# Patient Record
Sex: MALE | Race: WHITE | NOT HISPANIC OR LATINO | Employment: FULL TIME | URBAN - METROPOLITAN AREA
[De-identification: names, ages, dates, MRNs, and addresses within clinical notes are randomized per-mention and may not be internally consistent; named-entity substitution may affect disease eponyms.]

---

## 2017-01-24 ENCOUNTER — HOSPITAL ENCOUNTER (OUTPATIENT)
Dept: RADIOLOGY | Facility: MEDICAL CENTER | Age: 37
End: 2017-01-24
Attending: NURSE PRACTITIONER
Payer: COMMERCIAL

## 2017-01-24 ENCOUNTER — OFFICE VISIT (OUTPATIENT)
Dept: MEDICAL GROUP | Facility: PHYSICIAN GROUP | Age: 37
End: 2017-01-24
Payer: COMMERCIAL

## 2017-01-24 VITALS
OXYGEN SATURATION: 96 % | BODY MASS INDEX: 29.16 KG/M2 | DIASTOLIC BLOOD PRESSURE: 86 MMHG | HEART RATE: 69 BPM | SYSTOLIC BLOOD PRESSURE: 154 MMHG | WEIGHT: 247 LBS | RESPIRATION RATE: 16 BRPM | HEIGHT: 77 IN | TEMPERATURE: 98.4 F

## 2017-01-24 DIAGNOSIS — R14.0 ABDOMINAL BLOATING: Primary | ICD-10-CM

## 2017-01-24 DIAGNOSIS — R14.0 ABDOMINAL BLOATING: ICD-10-CM

## 2017-01-24 DIAGNOSIS — R19.4 RECENT CHANGE IN FREQUENCY OF BOWEL MOVEMENTS: ICD-10-CM

## 2017-01-24 PROCEDURE — 74020 DX-ABDOMEN-2 VIEWS: CPT

## 2017-01-24 PROCEDURE — 99213 OFFICE O/P EST LOW 20 MIN: CPT | Performed by: NURSE PRACTITIONER

## 2017-01-24 ASSESSMENT — PATIENT HEALTH QUESTIONNAIRE - PHQ9: CLINICAL INTERPRETATION OF PHQ2 SCORE: 0

## 2017-01-24 ASSESSMENT — ENCOUNTER SYMPTOMS: ROS GI COMMENTS: SEE HPI

## 2017-01-24 NOTE — PROGRESS NOTES
"Subjective:      Devin Carrasco is a 36 y.o. male who presents with Bloated and GI Problem            GI Problem    Devin is here today to discuss the following new problem:    1. Abdominal bloating 2. Recent change in frequency of bowel movements  Patient reports recent change to his bowel habits.  He also complains of increased abdominal bloating and tenderness.  Symptoms started approximately one month ago without any rhyme or reason.  He denies any recent travel, change to medication or achieving, food habits or recent illness.  He states that he did suffer a cold over the holidays and did have a couple of days where he ate out, but denies any significant changes otherwise.  He states that his stomach feels bloated most of the time with diffuse tenderness.  He also has been experiencing more frequent bowel movements with incomplete evacuation sensation.  He denies any nausea, vomiting or blood in the stool.  He did take a small amount of Pepto-Bismol with mild, temporary relief.  He denies any heartburn.  He has never had an abdominal surgery.    Active Ambulatory Problems     Diagnosis Date Noted   • Seasonal allergies 03/03/2014   • Right shoulder pain 07/02/2014   • Sore throat 09/14/2016     Resolved Ambulatory Problems     Diagnosis Date Noted   • Encounter to establish care with new doctor 09/01/2016     Past Medical History   Diagnosis Date   • Allergy    • Hip fracture (CMS-[facility])      Review of Systems   Gastrointestinal:        See HPI          Objective:     /86 mmHg  Pulse 69  Temp(Src) 36.9 °C (98.4 °F)  Resp 16  Ht 1.956 m (6' 5\")  Wt 112.038 kg (247 lb)  BMI 29.28 kg/m2  SpO2 96%     Physical Exam   Constitutional: He is oriented to person, place, and time. He appears well-developed and well-nourished.   Neck: Tracheal deviation: diffuse, generalized.   Cardiovascular: Normal rate.    Pulmonary/Chest: Effort normal.   Abdominal: Soft. He exhibits distension. He exhibits no mass. Bowel " sounds are increased. There is tenderness. There is no rebound and no guarding. No hernia.   Neurological: He is alert and oriented to person, place, and time.   Skin: Skin is warm and dry.   Psychiatric: He has a normal mood and affect. His behavior is normal.   Nursing note and vitals reviewed.              Assessment/Plan:     1. Abdominal bloating  [id number] VIEWS    US-ABDOMEN COMPLETE SURVEY    CBC WITHOUT DIFFERENTIAL    LIPASE    COMP METABOLIC PANEL    URINALYSIS   2. Recent change in frequency of bowel movements         1.  Abdominal x-ray today, evaluate gas patterns.  2.  Trial of daily probiotic, samples given  3.  Encouraged him to keep track of symptoms to see if there is any specific food or activity that may be to blame.  4.  Consider proceeding with ultrasound and labs if symptoms persist despite trying the above.

## 2017-01-24 NOTE — MR AVS SNAPSHOT
"        Devin Carrasco   2017 10:40 AM   Office Visit   MRN: [id number]    Department:  [facility]   Dept Phone:  [phone]    Description:  Male : 1980   Provider:  SUZANNA Timmons           Reason for Visit     Bloated     GI Problem states he is having more BMs then usual       Allergies as of 2017     Allergen Noted Reactions    Pcn [Penicillins] 2013   Hives, Swelling      You were diagnosed with     Abdominal bloating   [055334]  -  Primary     Recent change in frequency of bowel movements   [[id number]]         Vital Signs     Blood Pressure Pulse Temperature Respirations Height Weight    154/86 mmHg 69 36.9 °C (98.4 °F) 16 1.956 m (6' 5\") 112.038 kg (247 lb)    Body Mass Index Oxygen Saturation Smoking Status             29.28 kg/m2 96% Never Smoker          Basic Information     Date Of Birth Sex Race Ethnicity Preferred Language    1980 Male Unknown Unknown English      Problem List              ICD-10-CM Priority Class Noted - Resolved    Seasonal allergies J30.2   3/3/2014 - Present    Right shoulder pain M25.511   2014 - Present    Sore throat J02.9   2016 - Present      Health Maintenance        Date Due Completion Dates    IMM INFLUENZA (1) 2016 ---    IMM DTaP/Tdap/Td Vaccine (2 - Td) 2025            Current Immunizations     Tdap Vaccine 2015      Below and/or attached are the medications your provider expects you to take. Review all of your home medications and newly ordered medications with your provider and/or pharmacist. Follow medication instructions as directed by your provider and/or pharmacist. Please keep your medication list with you and share with your provider. Update the information when medications are discontinued, doses are changed, or new medications (including over-the-counter products) are added; and carry medication information at all times in the event of emergency situations     Allergies:  PCN - " Hives,Swelling               Medications  Valid as of: January 24, 2017 -  6:34 PM    Generic Name Brand Name Tablet Size Instructions for use    Albuterol Sulfate (Aero Soln) albuterol 108 (90 BASE) MCG/ACT Inhale 2 Puffs by mouth every 6 hours as needed for Shortness of Breath.        Ergocalciferol (Cap) DRISDOL 27739 UNIT Take 1 cap by mouth every day for 10 days, and then take 1 cap by mouth every Tuesday and Saturday.        Loratadine-Pseudoephedrine (TABLET SR 24 HR) CLARITIN-D 24-hour  MG Take 1 Tab by mouth every day.        .                 Medicines prescribed today were sent to:     [facility] [address] [place] [address] [place], [place] - [address] AT [facility] [employer] & [place]    [address] [address]    Phone: [phone] Fax: [phone]    Open 24 Hours?: No      Medication refill instructions:       If your prescription bottle indicates you have medication refills left, it is not necessary to call your provider’s office. Please contact your pharmacy and they will refill your medication.    If your prescription bottle indicates you do not have any refills left, you may request refills at any time through one of the following ways: The online [employer]plified system (except Urgent Care), by calling your provider’s office, or by asking your pharmacy to contact your provider’s office with a refill request. Medication refills are processed only during regular business hours and may not be available until the next business day. Your provider may request additional information or to have a follow-up visit with you prior to refilling your medication.   *Please Note: Medication refills are assigned a new Rx number when refilled electronically. Your pharmacy may indicate that no refills were authorized even though a new prescription for the same medication is available at the pharmacy. Please request the medicine by name with the pharmacy before contacting your provider for a refill.           Your To Do List     Future Labs/Procedures Complete By Expires    CBC WITHOUT DIFFERENTIAL  As directed 7/24/2017    COMP METABOLIC PANEL  As directed 1/24/2018    [id number] VIEWS  As directed 1/24/2018    LIPASE  As directed 1/24/2018    US-ABDOMEN COMPLETE SURVEY  As directed 1/24/2018         MyChart Access Code: Activation code not generated  Current [employer] Status: Active

## 2017-02-27 ENCOUNTER — HOSPITAL ENCOUNTER (OUTPATIENT)
Dept: LAB | Facility: MEDICAL CENTER | Age: 37
End: 2017-02-27
Attending: NURSE PRACTITIONER
Payer: COMMERCIAL

## 2017-02-27 ENCOUNTER — OFFICE VISIT (OUTPATIENT)
Dept: MEDICAL GROUP | Facility: PHYSICIAN GROUP | Age: 37
End: 2017-02-27
Payer: COMMERCIAL

## 2017-02-27 VITALS
DIASTOLIC BLOOD PRESSURE: 92 MMHG | OXYGEN SATURATION: 96 % | TEMPERATURE: 98.1 F | SYSTOLIC BLOOD PRESSURE: 122 MMHG | BODY MASS INDEX: 29.19 KG/M2 | HEART RATE: 77 BPM | HEIGHT: 77 IN | WEIGHT: 247.2 LBS | RESPIRATION RATE: 18 BRPM

## 2017-02-27 DIAGNOSIS — R10.816 EPIGASTRIC ABDOMINAL TENDERNESS WITHOUT REBOUND TENDERNESS: ICD-10-CM

## 2017-02-27 DIAGNOSIS — R14.0 ABDOMINAL BLOATING: ICD-10-CM

## 2017-02-27 DIAGNOSIS — E78.2 MIXED HYPERLIPIDEMIA: ICD-10-CM

## 2017-02-27 DIAGNOSIS — E55.9 VITAMIN D INSUFFICIENCY: ICD-10-CM

## 2017-02-27 DIAGNOSIS — K57.32 DIVERTICULITIS OF LARGE INTESTINE WITHOUT PERFORATION OR ABSCESS WITHOUT BLEEDING: Primary | ICD-10-CM

## 2017-02-27 LAB
25(OH)D3 SERPL-MCNC: 15 NG/ML (ref 30–100)
ALBUMIN SERPL BCP-MCNC: 4.5 G/DL (ref 3.2–4.9)
ALBUMIN/GLOB SERPL: 1.3 G/DL
ALP SERPL-CCNC: 73 U/L (ref 30–99)
ALT SERPL-CCNC: 46 U/L (ref 2–50)
ANION GAP SERPL CALC-SCNC: 10 MMOL/L (ref 0–11.9)
AST SERPL-CCNC: 27 U/L (ref 12–45)
BILIRUB SERPL-MCNC: 1.6 MG/DL (ref 0.1–1.5)
BUN SERPL-MCNC: 14 MG/DL (ref 8–22)
CALCIUM SERPL-MCNC: 10.1 MG/DL (ref 8.5–10.5)
CHLORIDE SERPL-SCNC: 102 MMOL/L (ref 96–112)
CHOLEST SERPL-MCNC: 230 MG/DL (ref 100–199)
CO2 SERPL-SCNC: 26 MMOL/L (ref 20–33)
CREAT SERPL-MCNC: 1.04 MG/DL (ref 0.5–1.4)
ERYTHROCYTE [DISTWIDTH] IN BLOOD BY AUTOMATED COUNT: 38.6 FL (ref 35.9–50)
GLOBULIN SER CALC-MCNC: 3.6 G/DL (ref 1.9–3.5)
GLUCOSE SERPL-MCNC: 75 MG/DL (ref 65–99)
HCT VFR BLD AUTO: 49.4 % (ref 42–52)
HDLC SERPL-MCNC: 37 MG/DL
HGB BLD-MCNC: 16.4 G/DL (ref 14–18)
LDLC SERPL CALC-MCNC: 127 MG/DL
LIPASE SERPL-CCNC: 18 U/L (ref 11–82)
MCH RBC QN AUTO: 29 PG (ref 27–33)
MCHC RBC AUTO-ENTMCNC: 33.2 G/DL (ref 33.7–35.3)
MCV RBC AUTO: 87.4 FL (ref 81.4–97.8)
PLATELET # BLD AUTO: 264 K/UL (ref 164–446)
PMV BLD AUTO: 10.4 FL (ref 9–12.9)
POTASSIUM SERPL-SCNC: 3.8 MMOL/L (ref 3.6–5.5)
PROT SERPL-MCNC: 8.1 G/DL (ref 6–8.2)
RBC # BLD AUTO: 5.65 M/UL (ref 4.7–6.1)
SODIUM SERPL-SCNC: 138 MMOL/L (ref 135–145)
TRIGL SERPL-MCNC: 328 MG/DL (ref 0–149)
WBC # BLD AUTO: 6.2 K/UL (ref 4.8–10.8)

## 2017-02-27 PROCEDURE — 36415 COLL VENOUS BLD VENIPUNCTURE: CPT

## 2017-02-27 PROCEDURE — 80061 LIPID PANEL: CPT

## 2017-02-27 PROCEDURE — 80053 COMPREHEN METABOLIC PANEL: CPT

## 2017-02-27 PROCEDURE — 99214 OFFICE O/P EST MOD 30 MIN: CPT | Performed by: NURSE PRACTITIONER

## 2017-02-27 PROCEDURE — 82306 VITAMIN D 25 HYDROXY: CPT

## 2017-02-27 PROCEDURE — 85027 COMPLETE CBC AUTOMATED: CPT

## 2017-02-27 PROCEDURE — 83690 ASSAY OF LIPASE: CPT

## 2017-02-27 RX ORDER — CIPROFLOXACIN 500 MG/1
500 TABLET, FILM COATED ORAL 2 TIMES DAILY
Qty: 20 TAB | Refills: 0 | Status: SHIPPED | OUTPATIENT
Start: 2017-02-27 | End: 2017-03-09

## 2017-02-27 RX ORDER — METRONIDAZOLE 500 MG/1
500 TABLET ORAL 2 TIMES DAILY
Qty: 20 TAB | Refills: 0 | Status: SHIPPED | OUTPATIENT
Start: 2017-02-27 | End: 2017-03-09

## 2017-02-27 ASSESSMENT — ENCOUNTER SYMPTOMS: ROS GI COMMENTS: SEE HPI

## 2017-02-27 NOTE — PROGRESS NOTES
"Subjective:      Devin Carrasco is a 36 y.o. male who presents with Follow-Up            HPI  Devin is here to discuss the followin. Diverticulitis of large intestine without perforation or abscess without bleeding  2. Epigastric abdominal tenderness without rebound tenderness  Patient returns with continued complaints of generalized abdominal pain, bloating and increased frequency in his bowel movements.  He was seen for similar symptoms approximately one month ago.  Since his last visit, he has been taking probiotics which have helped significantly, however patient states that bowel movements are still more frequent and urgent at times.  He is also experiencing epigastric pain that radiates into the left lower abdomen.  He denies any fever, chills, nausea or vomiting.  Denies any recent travel.  He feels as though his diet is fairly clean, rarely eats high fat or processed foods.  He is eating significant amount of fruits and vegetables daily.  Denies any blood in the stool.  Denies any personal or family history of diverticulitis.  Does report family history of colon cancer.    3. Mixed hyperlipidemia    4. Vitamin D insufficiency    Active Ambulatory Problems     Diagnosis Date Noted   • Seasonal allergies 2014   • Right shoulder pain 2014   • Sore throat 2016     Resolved Ambulatory Problems     Diagnosis Date Noted   • Encounter to establish care with new doctor 2016     Past Medical History   Diagnosis Date   • Allergy    • Hip fracture (CMS-[facility])      Review of Systems   Gastrointestinal:        See HPI          Objective:     /92 mmHg  Pulse 77  Temp(Src) 36.7 °C (98.1 °F)  Resp 18  Ht 1.956 m (6' 5.01\")  Wt 112.129 kg (247 lb 3.2 oz)  BMI 29.31 kg/m2  SpO2 96%     Physical Exam   Constitutional: He appears well-developed and well-nourished.   Cardiovascular: Normal rate.    Pulmonary/Chest: Effort normal.   Abdominal: Soft. Bowel sounds are normal. He exhibits no " mass. There is tenderness (epigastric and LLQ). There is no rebound and no guarding. No hernia.   Skin: Skin is warm and dry.   Psychiatric: He has a normal mood and affect. His behavior is normal.   Nursing note and vitals reviewed.              Assessment/Plan:     1. Diverticulitis of large intestine without perforation or abscess without bleeding  metronidazole (FLAGYL) 500 MG Tab    ciprofloxacin (CIPRO) 500 MG Tab    REFERRAL TO GASTROENTEROLOGY   2. Epigastric abdominal tenderness without rebound tenderness  metronidazole (FLAGYL) 500 MG Tab    ciprofloxacin (CIPRO) 500 MG Tab    REFERRAL TO GASTROENTEROLOGY   3. Mixed hyperlipidemia  LIPID PROFILE   4. Vitamin D insufficiency  VITAMIN D,25 HYDROXY       1.  Treat empirically for diverticulitis, patient reports anaphylactic reaction to penicillin, proceed with ciprofloxacin and Flagyl.  2.  Encouraged continued use of probiotics  3.  Labs today consider evaluation.  4.  Consider evaluation by gastroenterology if symptoms worsen or fail to resolve.

## 2017-03-03 ENCOUNTER — PATIENT MESSAGE (OUTPATIENT)
Dept: MEDICAL GROUP | Facility: PHYSICIAN GROUP | Age: 37
End: 2017-03-03

## 2017-03-15 ENCOUNTER — HOSPITAL ENCOUNTER (OUTPATIENT)
Dept: LAB | Facility: MEDICAL CENTER | Age: 37
End: 2017-03-15
Attending: INTERNAL MEDICINE
Payer: COMMERCIAL

## 2017-03-15 LAB
CRP SERPL HS-MCNC: 1.1 MG/L (ref 0–7.5)
ERYTHROCYTE [SEDIMENTATION RATE] IN BLOOD BY WESTERGREN METHOD: 32 MM/HOUR (ref 0–15)

## 2017-03-15 PROCEDURE — 36415 COLL VENOUS BLD VENIPUNCTURE: CPT

## 2017-03-15 PROCEDURE — 86141 C-REACTIVE PROTEIN HS: CPT

## 2017-03-15 PROCEDURE — 85652 RBC SED RATE AUTOMATED: CPT

## 2017-03-16 ENCOUNTER — HOSPITAL ENCOUNTER (OUTPATIENT)
Dept: LAB | Facility: MEDICAL CENTER | Age: 37
End: 2017-03-16
Attending: INTERNAL MEDICINE
Payer: COMMERCIAL

## 2017-03-16 ENCOUNTER — HOSPITAL ENCOUNTER (OUTPATIENT)
Facility: MEDICAL CENTER | Age: 37
End: 2017-03-16
Attending: INTERNAL MEDICINE
Payer: COMMERCIAL

## 2017-03-16 LAB
G LAMBLIA+C PARVUM AG STL QL RAPID: NORMAL
HEMOCCULT STL QL: NEGATIVE
SIGNIFICANT IND 70042: NORMAL
SITE SITE: NORMAL
SOURCE SOURCE: NORMAL

## 2017-03-16 PROCEDURE — 83630 LACTOFERRIN FECAL (QUAL): CPT

## 2017-03-16 PROCEDURE — 83993 ASSAY FOR CALPROTECTIN FECAL: CPT

## 2017-03-16 PROCEDURE — 87328 CRYPTOSPORIDIUM AG IA: CPT

## 2017-03-16 PROCEDURE — 87329 GIARDIA AG IA: CPT

## 2017-03-16 PROCEDURE — 82272 OCCULT BLD FECES 1-3 TESTS: CPT

## 2017-03-16 PROCEDURE — 87338 HPYLORI STOOL AG IA: CPT

## 2017-03-17 ENCOUNTER — HOSPITAL ENCOUNTER (OUTPATIENT)
Facility: MEDICAL CENTER | Age: 37
End: 2017-03-17
Attending: INTERNAL MEDICINE
Payer: COMMERCIAL

## 2017-03-17 LAB
G LAMBLIA+C PARVUM AG STL QL RAPID: NORMAL
SIGNIFICANT IND 70042: NORMAL
SOURCE SOURCE: NORMAL

## 2017-03-17 PROCEDURE — 87329 GIARDIA AG IA: CPT

## 2017-03-17 PROCEDURE — 87328 CRYPTOSPORIDIUM AG IA: CPT

## 2017-03-18 LAB
H PYLORI AG STL QL IA: NEGATIVE
LACTOFERRIN STL QL IA: NEGATIVE

## 2017-03-19 LAB — CALPROTECTIN STL-MCNT: <16 UG/G

## 2017-03-20 ENCOUNTER — OFFICE VISIT (OUTPATIENT)
Dept: MEDICAL GROUP | Facility: PHYSICIAN GROUP | Age: 37
End: 2017-03-20
Payer: COMMERCIAL

## 2017-03-20 VITALS
HEART RATE: 92 BPM | TEMPERATURE: 98.1 F | DIASTOLIC BLOOD PRESSURE: 98 MMHG | SYSTOLIC BLOOD PRESSURE: 144 MMHG | HEIGHT: 77 IN | OXYGEN SATURATION: 95 % | BODY MASS INDEX: 28.46 KG/M2 | WEIGHT: 241 LBS | RESPIRATION RATE: 14 BRPM

## 2017-03-20 DIAGNOSIS — R10.13 ACUTE EPIGASTRIC PAIN: Primary | ICD-10-CM

## 2017-03-20 PROCEDURE — 99214 OFFICE O/P EST MOD 30 MIN: CPT | Performed by: NURSE PRACTITIONER

## 2017-03-20 RX ORDER — SUCRALFATE ORAL 1 G/10ML
1 SUSPENSION ORAL
Qty: 280 ML | Refills: 0 | Status: SHIPPED | OUTPATIENT
Start: 2017-03-20 | End: 2017-03-27

## 2017-03-20 ASSESSMENT — ENCOUNTER SYMPTOMS: ROS GI COMMENTS: SEE HPI

## 2017-03-20 NOTE — PROGRESS NOTES
"Subjective:      Devin Carrasco is a 36 y.o. male who presents with GI Problem            GI Problem    Devin is here today to discuss the followin. Acute epigastric pain  Patient has been seen several times over the past several months for acute abdominal pain.  He was also evaluated by Dr. Beth, last week in regards to his ongoing symptoms.  Stool testing was performed and everything so far is found to be negative.  He states that he was feeling back to normal, and tell he splurged on his diet on Saturday and overate high processed and high fat foods.  He states that symptoms have returned and he is complaining mostly of significant epigastric pain that interferes with his sleep.  He is trying multiple over-the-counter remedies and has gotten mild, temporary relief with Tums.  He is frustrated as again he was feeling much better prior to that.  He denies any blood in the stool.  He is experiencing improving nausea with diarrhea.    Active Ambulatory Problems     Diagnosis Date Noted   • Seasonal allergies 2014   • Right shoulder pain 2014   • Sore throat 2016     Resolved Ambulatory Problems     Diagnosis Date Noted   • Encounter to establish care with new doctor 2016     Past Medical History   Diagnosis Date   • Allergy    • Hip fracture (CMS-[facility])      Review of Systems   Gastrointestinal:        See HPI          Objective:     /98 mmHg  Pulse 92  Temp(Src) 36.7 °C (98.1 °F)  Resp 14  Ht 1.956 m (6' 5.01\")  Wt 109.317 kg (241 lb)  BMI 28.57 kg/m2  SpO2 95%     Physical Exam   Constitutional: He is oriented to person, place, and time. He appears well-developed and well-nourished.   Cardiovascular: Normal rate.    Pulmonary/Chest: Effort normal.   Abdominal: He exhibits no distension. There is tenderness (epigastric).   Neurological: He is alert and oriented to person, place, and time.   Skin: Skin is warm and dry.   Psychiatric: He has a normal mood and affect. His " behavior is normal.   Nursing note and vitals reviewed.              Assessment/Plan:     1. Acute epigastric pain  Carafate as needed until symptoms resolve.  Encourage patient to maintain a very bland diet until symptoms resolve completely and then slowly introduce food back at 2-3 day intervals.  Consider rescheduling with Dr. Beth if symptoms fail to resolve completely.  - sucralfate (CARAFATE) 1 GM/10ML Suspension; Take 10 mL by mouth 4 Times a Day,Before Meals and at Bedtime for 7 days.  Dispense: 280 mL; Refill: 0

## 2017-03-20 NOTE — MR AVS SNAPSHOT
"Devin Carrasco   3/20/2017 2:00 PM   Office Visit   MRN: [id number]    Department:  [facility]   Dept Phone:  [phone]    Description:  Male : 1980   Provider:  SUZANNA Timmons           Reason for Visit     GI Problem           Allergies as of 3/20/2017     Allergen Noted Reactions    Pcn [Penicillins] 2013   Hives, Swelling      You were diagnosed with     Acute epigastric pain   [[id number]]  -  Primary       Vital Signs     Blood Pressure Pulse Temperature Respirations Height Weight    144/98 mmHg 92 36.7 °C (98.1 °F) 14 1.956 m (6' 5.01\") 109.317 kg (241 lb)    Body Mass Index Oxygen Saturation Smoking Status             28.57 kg/m2 95% Never Smoker          Basic Information     Date Of Birth Sex Race Ethnicity Preferred Language    1980 Male Unknown Unknown English      Problem List              ICD-10-CM Priority Class Noted - Resolved    Seasonal allergies J30.2   3/3/2014 - Present    Right shoulder pain M25.511   2014 - Present    Sore throat J02.9   2016 - Present      Health Maintenance        Date Due Completion Dates    IMM INFLUENZA (1) 2016 ---    IMM DTaP/Tdap/Td Vaccine (2 - Td) 2025            Current Immunizations     Tdap Vaccine 2015      Below and/or attached are the medications your provider expects you to take. Review all of your home medications and newly ordered medications with your provider and/or pharmacist. Follow medication instructions as directed by your provider and/or pharmacist. Please keep your medication list with you and share with your provider. Update the information when medications are discontinued, doses are changed, or new medications (including over-the-counter products) are added; and carry medication information at all times in the event of emergency situations     Allergies:  PCN - Hives,Swelling               Medications  Valid as of: 2017 -  2:53 PM    Generic Name Brand Name " Tablet Size Instructions for use    Albuterol Sulfate (Aero Soln) albuterol 108 (90 BASE) MCG/ACT Inhale 2 Puffs by mouth every 6 hours as needed for Shortness of Breath.        Ergocalciferol (Cap) DRISDOL 52018 UNIT Take 1 cap by mouth every day for 10 days, and then take 1 cap by mouth every Tuesday and Saturday.        Loratadine-Pseudoephedrine (TABLET SR 24 HR) CLARITIN-D 24-hour  MG Take 1 Tab by mouth every day.        Probiotic Product   Take  by mouth.        Sucralfate (Suspension) CARAFATE 1 GM/10ML Take 10 mL by mouth 4 Times a Day,Before Meals and at Bedtime for 7 days.        .                 Medicines prescribed today were sent to:     [facility] [address] [place] [address] [facility] [address] AT [facility] [employer] [facility] & [place]    [address] [facility] [address]    Phone: [phone] Fax: [phone]    Open 24 Hours?: No      Medication refill instructions:       If your prescription bottle indicates you have medication refills left, it is not necessary to call your provider’s office. Please contact your pharmacy and they will refill your medication.    If your prescription bottle indicates you do not have any refills left, you may request refills at any time through one of the following ways: The online DisplayLink system (except Urgent Care), by calling your provider’s office, or by asking your pharmacy to contact your provider’s office with a refill request. Medication refills are processed only during regular business hours and may not be available until the next business day. Your provider may request additional information or to have a follow-up visit with you prior to refilling your medication.   *Please Note: Medication refills are assigned a new Rx number when refilled electronically. Your pharmacy may indicate that no refills were authorized even though a new prescription for the same medication is available at the pharmacy. Please request the medicine by name with the  pharmacy before contacting your provider for a refill.           MyChart Access Code: Activation code not generated  Current MyChart Status: Active

## 2017-06-05 ENCOUNTER — OFFICE VISIT (OUTPATIENT)
Dept: MEDICAL GROUP | Facility: PHYSICIAN GROUP | Age: 37
End: 2017-06-05
Payer: COMMERCIAL

## 2017-06-05 VITALS
TEMPERATURE: 98.2 F | HEIGHT: 77 IN | HEART RATE: 86 BPM | DIASTOLIC BLOOD PRESSURE: 84 MMHG | SYSTOLIC BLOOD PRESSURE: 132 MMHG | BODY MASS INDEX: 29.05 KG/M2 | OXYGEN SATURATION: 97 % | RESPIRATION RATE: 16 BRPM | WEIGHT: 246 LBS

## 2017-06-05 DIAGNOSIS — J30.1 SEASONAL ALLERGIC RHINITIS DUE TO POLLEN: Primary | ICD-10-CM

## 2017-06-05 PROCEDURE — 99999 PR NO CHARGE: CPT | Performed by: NURSE PRACTITIONER

## 2017-06-05 PROCEDURE — 99214 OFFICE O/P EST MOD 30 MIN: CPT | Performed by: NURSE PRACTITIONER

## 2017-06-05 RX ORDER — TRIAMCINOLONE ACETONIDE 40 MG/ML
40 INJECTION, SUSPENSION INTRA-ARTICULAR; INTRAMUSCULAR ONCE
Status: COMPLETED | OUTPATIENT
Start: 2017-06-05 | End: 2017-06-05

## 2017-06-05 RX ADMIN — TRIAMCINOLONE ACETONIDE 40 MG: 40 INJECTION, SUSPENSION INTRA-ARTICULAR; INTRAMUSCULAR at 11:43

## 2017-06-05 NOTE — PROGRESS NOTES
Chief Complaint   Patient presents with   • Allergic Rhinitis       HISTORY OF PRESENT ILLNESS: Patient is a 36 y.o. male established patient who presents today to discuss the followin. Seasonal allergic rhinitis due to pollen  Patient has loud with environmental allergens for a number of years.  He often gets Kenalog 40 mg 1-2 times per year that works well to control his symptoms in addition to over-the-counter anti-inflammatories.  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  Unfortunately there is little he can do to avoid the irritants.  He denies any adverse reactions to the medication in the past.  His last injection was .    - Triamcinolone Acetonide    Allergies:Pcn    Current Outpatient Prescriptions Ordered in [facility]   Medication Sig Dispense Refill   • Probiotic Product (PROBIOTIC PO) Take  by mouth.     • loratadine-pseudoephedrine (CLARITIN-D 24-HOUR)  MG per tablet Take 1 Tab by mouth every day. 30 Tab 3   • albuterol (VENTOLIN OR PROVENTIL) 108 (90 BASE) MCG/ACT AERS Inhale 2 Puffs by mouth every 6 hours as needed for Shortness of Breath. 8.5 g 3   • ergocalciferol (DRISDOL) 00253 UNIT capsule Take 1 cap by mouth every day for 10 days, and then take 1 cap by mouth every Tuesday and Saturday. 16 Cap 0     Current Facility-Administered Medications Ordered in Epic   Medication Dose Route Frequency Provider Last Rate Last Dose   • triamcinolone acetonide (KENALOG-40) injection 40 mg  40 mg Intramuscular Once Christa Onofre, A.P.N.           Past Medical History   Diagnosis Date   • Allergy    • Seasonal allergies 3/3/2014   • Right shoulder pain 2014   • Hip fracture (CMS-[facility])        Social History   Substance Use Topics   • Smoking status: Never Smoker    • Smokeless tobacco: Never Used   • Alcohol Use: 0.0 oz/week     0 Standard drinks or equivalent per week      Comment: occ       Family Status   Relation Status Death Age   • Mother Alive   "  • Father Alive      Family History   Problem Relation Age of Onset   • Hypertension Mother    • Cancer Father      colon   • Diabetes Father      pre-diabetes   • Heart Attack Maternal Uncle    • Heart Attack Maternal Grandfather    • Heart Attack Paternal Grandfather        ROS: see above    Review of Systems   Constitutional: Negative for fever, chills, weight loss and malaise/fatigue.   HENT: Negative for ear pain, nosebleeds, congestion, sore throat and neck pain.    Eyes: Negative for blurred vision.   Respiratory: Negative for cough, sputum production, shortness of breath and wheezing.    Cardiovascular: Negative for chest pain, palpitations, orthopnea and leg swelling.   Gastrointestinal: Negative for heartburn, nausea, vomiting and abdominal pain.   Genitourinary: Negative for dysuria, urgency and frequency.   Musculoskeletal: Negative for myalgias, back pain and joint pain.   Skin: Negative for rash and itching.   Neurological: Negative for dizziness, tingling, tremors, sensory change, focal weakness and headaches.   Endo/Heme/Allergies: Does not bruise/bleed easily.   Psychiatric/Behavioral: Negative for depression, suicidal ideas and memory loss.  The patient is not nervous/anxious and does not have insomnia.        Exam:  Blood pressure 132/84, pulse 86, temperature 36.8 °C (98.2 °F), resp. rate 16, height 1.956 m (6' 5.01\"), weight 111.585 kg (246 lb), SpO2 97 %.  General:  Well nourished, well developed male in NAD  Head is grossly normal.  Neck: Thyroid is not enlarged.  Pulmonary: Normal effort.   Cardiovascular: Regular rate.   Extremities: no clubbing, cyanosis, or edema.  Psych:  Mood and affect are normal.  Answering questions appropriately with good eye contact.      Please note that this dictation was created using voice recognition software. I have made every reasonable attempt to correct obvious errors, but I expect that there are errors of grammar and possibly content that I did not " discover before finalizing the note.    Assessment/Plan:    1. Seasonal allergic rhinitis due to pollen  triamcinolone acetonide (KENALOG-40) injection 40 mg        1.  Proceed with Kenalog, counseled  2.  Return to clinic in the focal subluxation.

## 2017-06-05 NOTE — MR AVS SNAPSHOT
"Devin Carrasco   2017 9:20 AM   Office Visit   MRN: [id number]    Department:  [facility]   Dept Phone:  [phone]    Description:  Male : 1980   Provider:  SUZANNA Timmons           Reason for Visit     Allergic Rhinitis           Allergies as of 2017     Allergen Noted Reactions    Pcn [Penicillins] 2013   Hives, Swelling      You were diagnosed with     Seasonal allergic rhinitis due to pollen   [[id number]]  -  Primary       Vital Signs     Blood Pressure Pulse Temperature Respirations Height Weight    132/84 mmHg 86 36.8 °C (98.2 °F) 16 1.956 m (6' 5.01\") 111.585 kg (246 lb)    Body Mass Index Oxygen Saturation Smoking Status             29.17 kg/m2 97% Never Smoker          Basic Information     Date Of Birth Sex Race Ethnicity Preferred Language    1980 Male Unknown Unknown English      Problem List              ICD-10-CM Priority Class Noted - Resolved    Seasonal allergies J30.2   3/3/2014 - Present    Right shoulder pain M25.511   2014 - Present    Sore throat J02.9   2016 - Present      Health Maintenance        Date Due Completion Dates    IMM DTaP/Tdap/Td Vaccine (2 - Td) 2025            Current Immunizations     Tdap Vaccine 2015      Below and/or attached are the medications your provider expects you to take. Review all of your home medications and newly ordered medications with your provider and/or pharmacist. Follow medication instructions as directed by your provider and/or pharmacist. Please keep your medication list with you and share with your provider. Update the information when medications are discontinued, doses are changed, or new medications (including over-the-counter products) are added; and carry medication information at all times in the event of emergency situations     Allergies:  PCN - Hives,Swelling               Medications  Valid as of: 2017 - 10:38 AM    Generic Name Brand Name Tablet Size " Instructions for use    Albuterol Sulfate (Aero Soln) albuterol 108 (90 BASE) MCG/ACT Inhale 2 Puffs by mouth every 6 hours as needed for Shortness of Breath.        Ergocalciferol (Cap) DRISDOL 38040 UNIT Take 1 cap by mouth every day for 10 days, and then take 1 cap by mouth every Tuesday and Saturday.        Loratadine-Pseudoephedrine (TABLET SR 24 HR) CLARITIN-D 24-hour  MG Take 1 Tab by mouth every day.        Probiotic Product   Take  by mouth.        .                 Medicines prescribed today were sent to:     [facility] [address] [place] [address] [place], [place] - [address] AT [facility]. & [place]    [address] [facility] [address]    Phone: [phone] Fax: [phone]    Open 24 Hours?: No      Medication refill instructions:       If your prescription bottle indicates you have medication refills left, it is not necessary to call your provider’s office. Please contact your pharmacy and they will refill your medication.    If your prescription bottle indicates you do not have any refills left, you may request refills at any time through one of the following ways: The online [employer] system (except Urgent Care), by calling your provider’s office, or by asking your pharmacy to contact your provider’s office with a refill request. Medication refills are processed only during regular business hours and may not be available until the next business day. Your provider may request additional information or to have a follow-up visit with you prior to refilling your medication.   *Please Note: Medication refills are assigned a new Rx number when refilled electronically. Your pharmacy may indicate that no refills were authorized even though a new prescription for the same medication is available at the pharmacy. Please request the medicine by name with the pharmacy before contacting your provider for a refill.           [employer] Access Code: Activation code not generated  Current [employer] Status:  Active

## 2017-10-05 ENCOUNTER — APPOINTMENT (OUTPATIENT)
Dept: MEDICAL GROUP | Facility: PHYSICIAN GROUP | Age: 37
End: 2017-10-05
Payer: COMMERCIAL

## 2017-10-05 ENCOUNTER — NON-PROVIDER VISIT (OUTPATIENT)
Dept: MEDICAL GROUP | Facility: PHYSICIAN GROUP | Age: 37
End: 2017-10-05
Payer: COMMERCIAL

## 2017-10-05 DIAGNOSIS — J30.2 SEASONAL ALLERGIC RHINITIS, UNSPECIFIED CHRONICITY, UNSPECIFIED TRIGGER: ICD-10-CM

## 2017-10-05 PROCEDURE — 96372 THER/PROPH/DIAG INJ SC/IM: CPT | Performed by: NURSE PRACTITIONER

## 2017-10-05 RX ORDER — TRIAMCINOLONE ACETONIDE 40 MG/ML
40 INJECTION, SUSPENSION INTRA-ARTICULAR; INTRAMUSCULAR ONCE
Status: COMPLETED | OUTPATIENT
Start: 2017-10-05 | End: 2017-10-05

## 2017-10-05 RX ADMIN — TRIAMCINOLONE ACETONIDE 40 MG: 40 INJECTION, SUSPENSION INTRA-ARTICULAR; INTRAMUSCULAR at 17:54

## 2017-10-06 NOTE — NON-PROVIDER
Devin Carrasco is a 37 y.o. male here for a non-provider visit for Kenalog injection.    Reason for injection: Seasonal Allergies   Order in MAR?: Yes  Patient supplied?:No  Minimum interval has been met for this injection (per MAR order): No    Order and dose verified by: ML  Patient tolerated injection and no adverse effects were observed or reported: Yes    # of Administrations remaining in MAR: 0

## 2017-10-30 ENCOUNTER — OFFICE VISIT (OUTPATIENT)
Dept: MEDICAL GROUP | Facility: PHYSICIAN GROUP | Age: 37
End: 2017-10-30
Payer: COMMERCIAL

## 2017-10-30 DIAGNOSIS — Z76.89 ENCOUNTER TO ESTABLISH CARE WITH NEW DOCTOR: ICD-10-CM

## 2017-10-30 DIAGNOSIS — J45.20 MILD INTERMITTENT ASTHMA WITHOUT COMPLICATION: ICD-10-CM

## 2017-10-30 DIAGNOSIS — J30.2 SEASONAL ALLERGIC RHINITIS, UNSPECIFIED CHRONICITY, UNSPECIFIED TRIGGER: ICD-10-CM

## 2017-10-30 PROCEDURE — 99214 OFFICE O/P EST MOD 30 MIN: CPT | Performed by: NURSE PRACTITIONER

## 2017-11-17 ENCOUNTER — OFFICE VISIT (OUTPATIENT)
Dept: URGENT CARE | Facility: PHYSICIAN GROUP | Age: 37
End: 2017-11-17
Payer: COMMERCIAL

## 2017-11-17 VITALS
BODY MASS INDEX: 28.57 KG/M2 | HEIGHT: 77 IN | TEMPERATURE: 97.9 F | DIASTOLIC BLOOD PRESSURE: 82 MMHG | WEIGHT: 242 LBS | HEART RATE: 103 BPM | OXYGEN SATURATION: 94 % | SYSTOLIC BLOOD PRESSURE: 132 MMHG

## 2017-11-17 DIAGNOSIS — J01.41 ACUTE RECURRENT PANSINUSITIS: ICD-10-CM

## 2017-11-17 PROCEDURE — 99213 OFFICE O/P EST LOW 20 MIN: CPT | Performed by: FAMILY MEDICINE

## 2017-11-17 RX ORDER — SULFAMETHOXAZOLE AND TRIMETHOPRIM 800; 160 MG/1; MG/1
1 TABLET ORAL EVERY 12 HOURS
Qty: 20 TAB | Refills: 0 | Status: SHIPPED | OUTPATIENT
Start: 2017-11-17 | End: 2017-11-27

## 2017-11-17 ASSESSMENT — ENCOUNTER SYMPTOMS
MYALGIAS: 0
EYE REDNESS: 0
EYE DISCHARGE: 0
WEIGHT LOSS: 0
HEADACHES: 0

## 2017-11-17 NOTE — PROGRESS NOTES
"Subjective:      Devin Carrasco is a 37 y.o. male who presents with Sinus Problem (Sinus congestion, pressure and pain, cough, loss of voice x 4 days )            4 days sinus pressure, subjective fever/chills, myalagia. Chronic congestion and drainage due to allergies. +PMH sinusitis/no sinus surgery. +ST. Productive cough without blood sputum. No SOB/wheeze. OTC mucinex without relief. Sx's worse with laying down. No other aggravating or alleviating factors.          Review of Systems   Constitutional: Positive for malaise/fatigue. Negative for weight loss.   HENT: Negative for ear discharge and ear pain.    Eyes: Negative for discharge and redness.   Musculoskeletal: Negative for joint pain and myalgias.   Skin: Negative for itching and rash.   Neurological: Negative for headaches.     .  Medications, Allergies, and current problem list reviewed today in Epic       Objective:     /82   Pulse (!) 103   Temp 36.6 °C (97.9 °F)   Ht 1.956 m (6' 5\")   Wt 109.8 kg (242 lb)   SpO2 94%   BMI 28.70 kg/m²      Physical Exam   Constitutional: He appears well-developed and well-nourished. No distress.   HENT:   Head: Normocephalic and atraumatic.   Tender frontal and maxillary sinus bilateral, +PND     Eyes: Conjunctivae are normal.   Neck: Neck supple.   Cardiovascular: Normal rate, regular rhythm and normal heart sounds.    Pulmonary/Chest: Effort normal and breath sounds normal. He has no wheezes.   Lymphadenopathy:     He has no cervical adenopathy.   Neurological:   Speech is clear. Patient is appropriate and cooperative.     Skin: Skin is warm and dry. No rash noted.               Assessment/Plan:     1. Acute recurrent pansinusitis  sulfamethoxazole-trimethoprim (BACTRIM DS) 800-160 MG tablet    REFERRAL TO ENT     Differential diagnosis, natural history, supportive care, and indications for immediate follow-up discussed at length.   Nasal saline, decongestant  Patient does not tolerate nasal steroid.   "

## 2017-11-17 NOTE — LETTER
November 17, 2017         Patient: Devin Carrasco   YOB: 1980   Date of Visit: 11/17/2017           To Whom it May Concern:    Devin Carrasco was seen in my clinic on 11/17/2017. Please excuse 11/15 through 11/17/2017.      Sincerely,           Daniel Evans M.D.  Electronically Signed

## 2018-01-11 ENCOUNTER — OFFICE VISIT (OUTPATIENT)
Dept: MEDICAL GROUP | Facility: PHYSICIAN GROUP | Age: 38
End: 2018-01-11
Payer: COMMERCIAL

## 2018-01-11 VITALS
WEIGHT: 250 LBS | DIASTOLIC BLOOD PRESSURE: 80 MMHG | TEMPERATURE: 98.3 F | HEIGHT: 77 IN | RESPIRATION RATE: 16 BRPM | HEART RATE: 82 BPM | SYSTOLIC BLOOD PRESSURE: 124 MMHG | BODY MASS INDEX: 29.52 KG/M2 | OXYGEN SATURATION: 94 %

## 2018-01-11 DIAGNOSIS — G89.29 CHRONIC PAIN OF RIGHT KNEE: ICD-10-CM

## 2018-01-11 DIAGNOSIS — M25.552 CHRONIC LEFT HIP PAIN: ICD-10-CM

## 2018-01-11 DIAGNOSIS — D22.9 MULTIPLE NEVI: ICD-10-CM

## 2018-01-11 DIAGNOSIS — R51.9 CHRONIC NONINTRACTABLE HEADACHE, UNSPECIFIED HEADACHE TYPE: ICD-10-CM

## 2018-01-11 DIAGNOSIS — G89.29 CHRONIC LEFT HIP PAIN: ICD-10-CM

## 2018-01-11 DIAGNOSIS — Z77.090 ASBESTOS EXPOSURE: ICD-10-CM

## 2018-01-11 DIAGNOSIS — G89.29 CHRONIC NONINTRACTABLE HEADACHE, UNSPECIFIED HEADACHE TYPE: ICD-10-CM

## 2018-01-11 DIAGNOSIS — H93.13 TINNITUS OF BOTH EARS: ICD-10-CM

## 2018-01-11 DIAGNOSIS — M25.511 BILATERAL SHOULDER PAIN, UNSPECIFIED CHRONICITY: ICD-10-CM

## 2018-01-11 DIAGNOSIS — Z86.14 HISTORY OF MRSA INFECTION: ICD-10-CM

## 2018-01-11 DIAGNOSIS — G89.29 CHRONIC PAIN OF LEFT KNEE: ICD-10-CM

## 2018-01-11 DIAGNOSIS — M25.512 BILATERAL SHOULDER PAIN, UNSPECIFIED CHRONICITY: ICD-10-CM

## 2018-01-11 DIAGNOSIS — J32.9 CHRONIC SINUSITIS, UNSPECIFIED LOCATION: ICD-10-CM

## 2018-01-11 DIAGNOSIS — M25.562 CHRONIC PAIN OF LEFT KNEE: ICD-10-CM

## 2018-01-11 DIAGNOSIS — G89.29 CHRONIC LOW BACK PAIN, UNSPECIFIED BACK PAIN LATERALITY, WITH SCIATICA PRESENCE UNSPECIFIED: ICD-10-CM

## 2018-01-11 DIAGNOSIS — M25.561 CHRONIC PAIN OF RIGHT KNEE: ICD-10-CM

## 2018-01-11 DIAGNOSIS — M54.5 CHRONIC LOW BACK PAIN, UNSPECIFIED BACK PAIN LATERALITY, WITH SCIATICA PRESENCE UNSPECIFIED: ICD-10-CM

## 2018-01-11 DIAGNOSIS — M25.571 CHRONIC PAIN OF RIGHT ANKLE: ICD-10-CM

## 2018-01-11 DIAGNOSIS — G89.29 CHRONIC PAIN OF RIGHT ANKLE: ICD-10-CM

## 2018-01-11 PROBLEM — M54.50 CHRONIC LOW BACK PAIN: Status: ACTIVE | Noted: 2018-01-11

## 2018-01-11 PROCEDURE — 99214 OFFICE O/P EST MOD 30 MIN: CPT | Performed by: NURSE PRACTITIONER

## 2018-01-11 ASSESSMENT — PATIENT HEALTH QUESTIONNAIRE - PHQ9: CLINICAL INTERPRETATION OF PHQ2 SCORE: 0

## 2018-01-11 NOTE — ASSESSMENT & PLAN NOTE
Has had issues with left knee pain since his hip fracture.  Was then exacerbated by running and physical activity while in the [employer].  Takes NSAIDs as needed.

## 2018-01-11 NOTE — ASSESSMENT & PLAN NOTE
Chronic in nature, related to overuse injuries while in the .  Takes NSAIDs as needed.  Had an MRI of his right shoulder in 2014 and was followed by ortho.  Was told that he had multiple tears.

## 2018-01-11 NOTE — ASSESSMENT & PLAN NOTE
Chronic in nature.  Started while in the [employer].  Fell off of a ladder, fractured his hip, and landed on his head.  Has dealt with headaches ever since.  [facility].

## 2018-01-11 NOTE — ASSESSMENT & PLAN NOTE
Has dealt with chronic low back pain for years.  Mostly caused by heavy lifting and overuse while in the [place]. Had imaging several years ago.  Has issues with radiculopathy at times.  Takes NSAIDs as needed.

## 2018-01-11 NOTE — ASSESSMENT & PLAN NOTE
Has chronic right knee pain as a result of his l;eft hip and left knee injuries in the past.  Takes NSAIDs as needed.

## 2018-01-11 NOTE — ASSESSMENT & PLAN NOTE
Was exposed to asbestos while in the [employer].  Had a chest xray in 2014 and it was normal.  Uses an albuterol inhaler as needed for reactive airway issues.

## 2018-01-11 NOTE — PROGRESS NOTES
Chief Complaint   Patient presents with   • Other     [facility]       HISTORY OF PRESENT ILLNESS: Patient is a 37 y.o. male established patient who presents today to discuss the following issues:    Bilateral shoulder pain  Chronic in nature, related to overuse injuries while in the .  Takes NSAIDs as needed.  Had an MRI of his right shoulder in 2014 and was followed by ortho.  Was told that he had multiple tears.    Chronic sinusitis  Diagnosed while in the .  Gets Kenalog shots twice a year for presentation.  Takes antihistamine/decongestants regularly.    Chronic nonintractable headache  Chronic in nature.  Started while in the [employer].  Fell off of a ladder, fractured his hip, and landed on his head.  Has dealt with headaches ever since.  Is [facility].    Chronic low back pain  Has dealt with chronic low back pain for years.  Mostly caused by heavy lifting and overuse while in the [employer]. Had imaging several years ago.  Has issues with radiculopathy at times.  Takes NSAIDs as needed.    Chronic pain of left knee  Has had issues with left knee pain since his hip fracture.  Was then exacerbated by running and physical activity while in the [employer].  Takes NSAIDs as needed.    Chronic left hip pain  Fell off of a ladder and fractured his hip while in the [employer].  Has chronic hip pain as a result.  Takes NSAIDs as needed.  Is not able to sleep at times.    Chronic pain of right knee  Has chronic right knee pain as a result of his l;eft hip and left knee injuries in the past.  Takes NSAIDs as needed.    Chronic pain of right ankle  Twisted right ankle around 2003 and it has never felt right since.  Takes NSAIDS as needed.    Multiple nevi  Was diagnosed with multiple nevi while in the [facility].  Has seen dermatology multiple times.  Has not been diagnosed with any skin cancers.    Asbestos exposure  Was exposed to asbestos while in the [employer].  Had a chest xray  in 2014 and it was normal.  Uses an albuterol inhaler as needed for reactive airway issues.    History of MRSA infection  Had cellulitis in his right arm around 2004.  He was diagnosed with MRSA, and continues to have issues with recurrent skin infections.       Patient Active Problem List    Diagnosis Date Noted   • Tinnitus of both ears 01/11/2018   • Chronic sinusitis 01/11/2018   • Chronic nonintractable headache 01/11/2018   • Chronic low back pain 01/11/2018   • Chronic pain of left knee 01/11/2018   • Chronic left hip pain 01/11/2018   • Chronic pain of right knee 01/11/2018   • Chronic pain of right ankle 01/11/2018   • Multiple nevi 01/11/2018   • Asbestos exposure 01/11/2018   • History of MRSA infection 01/11/2018   • Bilateral shoulder pain 07/02/2014       Allergies:Pcn [penicillins]    Current Outpatient Prescriptions   Medication Sig Dispense Refill   • Probiotic Product (PROBIOTIC PO) Take  by mouth.     • ergocalciferol (DRISDOL) 57024 UNIT capsule Take 1 cap by mouth every day for 10 days, and then take 1 cap by mouth every Tuesday and Saturday. 16 Cap 0   • loratadine-pseudoephedrine (CLARITIN-D 24-HOUR)  MG per tablet Take 1 Tab by mouth every day. 30 Tab 3   • albuterol (VENTOLIN OR PROVENTIL) 108 (90 BASE) MCG/ACT AERS Inhale 2 Puffs by mouth every 6 hours as needed for Shortness of Breath. 8.5 g 3     No current facility-administered medications for this visit.        Social History   Substance Use Topics   • Smoking status: Never Smoker   • Smokeless tobacco: Never Used   • Alcohol use 0.0 oz/week      Comment: occ       Family Status   Relation Status   • Mother Alive   • Father Alive   • Maternal Uncle    • Maternal Grandfather    • Paternal Grandfather      Family History   Problem Relation Age of Onset   • Hypertension Mother    • Cancer Father      colon   • Diabetes Father      pre-diabetes   • Heart Attack Maternal Uncle    • Heart Attack Maternal Grandfather    • Heart Attack  "Paternal Grandfather        Review of Systems:   Constitutional: Negative for fever, chills, weight loss and malaise/fatigue.   HENT: Negative for ear pain, nosebleeds, congestion, sore throat and neck pain.    Eyes: Negative for blurred vision.   Respiratory: Negative for cough, sputum production, shortness of breath and wheezing.    Cardiovascular: Negative for chest pain, palpitations, orthopnea and leg swelling.   Gastrointestinal: Negative for heartburn, nausea, vomiting and abdominal pain.   Genitourinary: Negative for dysuria, urgency and frequency.   Musculoskeletal: Negative for myalgias, joint pain, and back pain.  Skin: Negative for rash and itching.   Neurological: Negative for dizziness, tingling, tremors, sensory change, focal weakness and headaches.   Endo/Heme/Allergies: Does not bruise/bleed easily.   Psychiatric/Behavioral: Negative for depression, suicidal ideas and memory loss.  The patient is not nervous/anxious and does not have insomnia.    All other systems reviewed and are negative except as in HPI.    Exam:  Blood pressure 124/80, pulse 82, temperature 36.8 °C (98.3 °F), resp. rate 16, height 1.956 m (6' 5\"), weight 113.4 kg (250 lb), SpO2 94 %.  General:  Well nourished, well developed male in NAD  Head: Grossly normal.  Neck: Supple without JVD or bruit. Thyroid is not enlarged.  Pulmonary: Clear to ausculation. Normal effort. No rales, ronchi, or wheezing.  Cardiovascular: Regular rate and rhythm without murmur.   Abdomen:  Soft, nontender, nondistended.  Extremities: No clubbing, cyanosis, or edema.  Skin: Intact with no obvious rashes or lesions.  Neuro: Grossly intact.  Psych: Alert and oriented x 3.  Mood and affect appropriate.    Medical decision-making and discussion: Devin is here today to discuss multiple issues.  We reviewed all of his chronic issues that developed while he was in the [employer].  He will follow-[facility] regarding [facility] issues with the [facility].  He will follow-up here as " needed.          Assessment/Plan:  1. Bilateral shoulder pain, unspecified chronicity     2. Tinnitus of both ears     3. Chronic sinusitis, unspecified location     4. Chronic nonintractable headache, unspecified headache type     5. Chronic low back pain, unspecified back pain laterality, with sciatica presence unspecified     6. Chronic pain of left knee     7. Chronic left hip pain     8. Chronic pain of right knee     9. Chronic pain of right ankle     10. Multiple nevi     11. Asbestos exposure     12. History of MRSA infection         Return if symptoms worsen or fail to improve.    Please note that this dictation was created using voice recognition software. I have made every reasonable attempt to correct obvious errors, but I expect that there are errors of grammar and possibly content that I did not discover before finalizing the note.

## 2018-01-11 NOTE — ASSESSMENT & PLAN NOTE
Had cellulitis in his right arm around 2004.  He was diagnosed with MRSA, and continues to have issues with recurrent skin infections.

## 2018-01-11 NOTE — ASSESSMENT & PLAN NOTE
Was diagnosed with multiple nevi while in the [place].  Has seen dermatology multiple times.  Has not been diagnosed with any skin cancers.

## 2018-01-11 NOTE — ASSESSMENT & PLAN NOTE
Diagnosed while in the .  Gets Kenalog shots twice a year for presentation.  Takes antihistamine/decongestants regularly.

## 2018-01-11 NOTE — ASSESSMENT & PLAN NOTE
Fell off of a ladder and fractured his hip while in the [place].  Has chronic hip pain as a result.  Takes NSAIDs as needed.  Is not able to sleep at times.

## 2018-01-15 ENCOUNTER — OFFICE VISIT (OUTPATIENT)
Dept: URGENT CARE | Facility: PHYSICIAN GROUP | Age: 38
End: 2018-01-15
Payer: COMMERCIAL

## 2018-01-15 ENCOUNTER — HOSPITAL ENCOUNTER (OUTPATIENT)
Dept: RADIOLOGY | Facility: MEDICAL CENTER | Age: 38
End: 2018-01-15
Attending: EMERGENCY MEDICINE
Payer: COMMERCIAL

## 2018-01-15 VITALS
TEMPERATURE: 98.6 F | SYSTOLIC BLOOD PRESSURE: 120 MMHG | DIASTOLIC BLOOD PRESSURE: 86 MMHG | WEIGHT: 250 LBS | BODY MASS INDEX: 29.52 KG/M2 | OXYGEN SATURATION: 96 % | HEIGHT: 77 IN | RESPIRATION RATE: 14 BRPM | HEART RATE: 80 BPM

## 2018-01-15 DIAGNOSIS — M79.671 RIGHT FOOT PAIN: ICD-10-CM

## 2018-01-15 DIAGNOSIS — S90.31XA CONTUSION OF RIGHT FOOT, INITIAL ENCOUNTER: ICD-10-CM

## 2018-01-15 PROCEDURE — 73620 X-RAY EXAM OF FOOT: CPT | Mod: RT

## 2018-01-15 PROCEDURE — 99214 OFFICE O/P EST MOD 30 MIN: CPT | Performed by: EMERGENCY MEDICINE

## 2018-01-15 NOTE — LETTER
January 15, 2018        Devin Carrasco  [address] [place] [address]        Dear Devin:    If you have any questions or concerns, please don't hesitate to call.        Sincerely,        Arsalan Kraus M.D.    Electronically Signed

## 2018-01-16 ASSESSMENT — ENCOUNTER SYMPTOMS
VOMITING: 0
SENSORY CHANGE: 0
COUGH: 0
SPEECH CHANGE: 0
FEVER: 0
NAUSEA: 0
NERVOUS/ANXIOUS: 0
ABDOMINAL PAIN: 0
CHILLS: 0
FOCAL WEAKNESS: 0

## 2018-01-16 NOTE — PROGRESS NOTES
Subjective:      Devin Carrasco is a 37 y.o. male who presents with Foot Pain (right foot, dropped a weight on it)            HPI  Patient was change weights on a [employer] limited 25 pound weight chemotherapy and barbell landed on his right foot. Patient has a superficial abrasion and tenderness over the dorsum of his mid metatarsals.    Allergies   Allergen Reactions   • Pcn [Penicillins] Hives and Swelling     Social History     Social History   • Marital status:      Spouse name: N/A   • Number of children: N/A   • Years of education: N/A     Occupational History   • Not on file.     Social History Main Topics   • Smoking status: Never Smoker   • Smokeless tobacco: Never Used   • Alcohol use 0.0 oz/week      Comment: occ   • Drug use: No   • Sexual activity: Yes     Partners: Female      Comment:      Other Topics Concern   • Not on file     Social History Narrative   • No narrative on file     Past Medical History:   Diagnosis Date   • Allergy    • Hip fracture ([facility])    • Right shoulder pain 7/2/2014   • Seasonal allergies 3/3/2014     Current Outpatient Prescriptions on File Prior to Visit   Medication Sig Dispense Refill   • Probiotic Product (PROBIOTIC PO) Take  by mouth.     • ergocalciferol (DRISDOL) 19907 UNIT capsule Take 1 cap by mouth every day for 10 days, and then take 1 cap by mouth every Tuesday and Saturday. 16 Cap 0   • loratadine-pseudoephedrine (CLARITIN-D 24-HOUR)  MG per tablet Take 1 Tab by mouth every day. 30 Tab 3   • albuterol (VENTOLIN OR PROVENTIL) 108 (90 BASE) MCG/ACT AERS Inhale 2 Puffs by mouth every 6 hours as needed for Shortness of Breath. 8.5 g 3     No current facility-administered medications on file prior to visit.      Family History   Problem Relation Age of Onset   • Hypertension Mother    • Cancer Father      colon   • Diabetes Father      pre-diabetes   • Heart Attack Maternal Uncle    • Heart Attack Maternal Grandfather    • Heart Attack  "Paternal Grandfather      Review of Systems   Constitutional: Negative for chills and fever.   Respiratory: Negative for cough.    Cardiovascular: Negative for chest pain.   Gastrointestinal: Negative for abdominal pain, nausea and vomiting.   Genitourinary: Negative.    Musculoskeletal: Positive for joint pain.   Skin:        Very superficial abrasion not through the skin with chest discomfort bernardino on the dorsum of his  foot.   Neurological: Negative for sensory change, speech change and focal weakness.   Psychiatric/Behavioral: The patient is not nervous/anxious.           Objective:     /86   Pulse 80   Temp 37 °C (98.6 °F)   Resp 14   Ht 1.956 m (6' 5\")   Wt 113.4 kg (250 lb)   SpO2 96%   BMI 29.65 kg/m²      Physical Exam   Constitutional: He appears well-developed and well-nourished. No distress.   HENT:   Head: Normocephalic and atraumatic.   Right Ear: External ear normal.   Eyes: Conjunctivae are normal.   Neck: Normal range of motion.   Cardiovascular: Normal rate and regular rhythm.    Pulmonary/Chest: Effort normal and breath sounds normal.   Abdominal: He exhibits no distension. There is no tenderness.   Musculoskeletal: Normal range of motion. He exhibits tenderness. He exhibits no edema or deformity.   Right foot is tender over the dorsum of his second third metatarsals distally. Able to ambulate with some difficulty recommended crutches and he accepted.   Neurological: He is alert. Coordination normal.   Skin: Skin is warm. He is not diaphoretic. There is erythema.   Psychiatric: He has a normal mood and affect. His behavior is normal.   Vitals reviewed.            Right foot x-rays negative.  Assessment/Plan:     1. Right foot pain/contusion    Patient has a contusion to his right foot I recommended crutch walking the next 2-3 days no climbing running. Patient will watch for infection otherwise he can progress to full weightbearing within the next 3-4 days. He'll return pain " persists.  - DX-FOOT-2- RIGHT; Future

## 2018-06-27 ENCOUNTER — APPOINTMENT (OUTPATIENT)
Dept: RADIOLOGY | Facility: MEDICAL CENTER | Age: 38
End: 2018-06-27
Attending: EMERGENCY MEDICINE
Payer: COMMERCIAL

## 2018-06-27 ENCOUNTER — HOSPITAL ENCOUNTER (EMERGENCY)
Facility: MEDICAL CENTER | Age: 38
End: 2018-06-27
Attending: EMERGENCY MEDICINE
Payer: COMMERCIAL

## 2018-06-27 VITALS
HEIGHT: 77 IN | SYSTOLIC BLOOD PRESSURE: 131 MMHG | OXYGEN SATURATION: 99 % | DIASTOLIC BLOOD PRESSURE: 85 MMHG | HEART RATE: 82 BPM | RESPIRATION RATE: 16 BRPM | WEIGHT: 246.91 LBS | BODY MASS INDEX: 29.15 KG/M2 | TEMPERATURE: 98.1 F

## 2018-06-27 DIAGNOSIS — S46.911A STRAIN OF RIGHT ELBOW, INITIAL ENCOUNTER: ICD-10-CM

## 2018-06-27 PROCEDURE — 99284 EMERGENCY DEPT VISIT MOD MDM: CPT

## 2018-06-27 PROCEDURE — 73070 X-RAY EXAM OF ELBOW: CPT | Mod: RT

## 2018-06-27 RX ORDER — KETOROLAC TROMETHAMINE 10 MG/1
10 TABLET, FILM COATED ORAL EVERY 6 HOURS PRN
Qty: 22 TAB | Refills: 2 | Status: SHIPPED | OUTPATIENT
Start: 2018-06-27 | End: 2019-06-04

## 2018-06-27 ASSESSMENT — PAIN SCALES - GENERAL: PAINLEVEL_OUTOF10: 4

## 2018-06-28 NOTE — DISCHARGE INSTRUCTIONS
Elbow Injury  You or your child has an elbow injury. X-rays and exam today do not show evidence of a fracture (broken bone). That means that only a sling or splint may be required for a brief period of time as directed by your caregiver.  HOME CARE INSTRUCTIONS  · Only take over-the-counter or prescription medicines for pain, discomfort, or fever as directed by your caregiver.   · If you have a splint held on with an elastic wrap or a cast, watch your hand or fingers. If they become numb or cold and blue, loosen the wrap and reapply more loosely. See your caregiver if there is no relief.   · You may use ice on your elbow for 15-20 minutes, 3-4 times per day, for the first 2 to 3 days.   · Use your elbow as directed.   · See your caregiver as directed. It is very important to keep all follow-up referrals and appointments in order to avoid any long-term problems with your elbow including chronic pain or inability to move the elbow normally.   SEEK IMMEDIATE MEDICAL CARE IF:   · There is swelling or increasing pain in your elbow which is not relieved with medications.   · You begin to lose feeling in your hand or fingers, or develop swelling of the hand and fingers.   · You get a cold or blue hand or fingers on injured side.   · If your elbow remains sore, your caregiver may want to x-ray it again. A hairline fracture may not show up on the first x-rays and may only be seen on repeat x-rays ten days to two weeks later. A specialist (radiologist) may examine your x-rays at a later time. In order to get results from the radiologist or their department, make sure you know how and when you are to get that information. It is your responsibility to get results of any tests you may have had.   MAKE SURE YOU:   · Understand these instructions.   · Will watch your condition.   · Will get help right away if you are not doing well or get worse.   Document Released: 03/09/2005 Document Revised: 03/11/2013 Document Reviewed:  08/05/2009  ExitCare® Patient Information ©2013 [employer].Return if fever, vomiting or if no better in 12 hours.

## 2018-06-28 NOTE — ED NOTES
Discharge information provided. Pt verbalized understanding of discharge instructions to follow up with PCP and Ortho and to return to ER if condition worsens. Pt expressed the awareness of not driving or operating heavy machinery, has ride home with Wife. Pt ambulated out of ER in a steady gait, no additional questions or concerns. Educated on RICE, sling, and medications.

## 2018-06-28 NOTE — ED NOTES
"C/O acute onset of sever pain affecting his right bicep after attempting to stand up from a \"floor position\" during a training session.   "

## 2018-06-28 NOTE — ED PROVIDER NOTES
ED Provider Note  CHIEF COMPLAINT  Chief Complaint   Patient presents with   • Arm Injury       HPI  Devin Carrasco is a 37 y.o. male who presents with pain, right elbow after doing martial arts tonight. Evidently he was doing some Of Maneuver Where His Arm Is Twisted.  He did not have pain until he put his arm down the floor to get up and had severe right elbow pain. Since that time elbows had severe pain, worse with motion and no similar episodes in the past  REVIEW OF SYSTEMS  See HPI for further details. All other systems are negative.     PAST MEDICAL HISTORY  Past Medical History:   Diagnosis Date   • Allergy    • Hip fracture (HCC)    • Right shoulder pain 7/2/2014   • Seasonal allergies 3/3/2014       FAMILY HISTORY  Family History   Problem Relation Age of Onset   • Hypertension Mother    • Cancer Father      colon   • Diabetes Father      pre-diabetes   • Heart Attack Maternal Uncle    • Heart Attack Maternal Grandfather    • Heart Attack Paternal Grandfather        SOCIAL HISTORY  Social History     Social History   • Marital status:      Spouse name: N/A   • Number of children: N/A   • Years of education: N/A     Social History Main Topics   • Smoking status: Never Smoker   • Smokeless tobacco: Never Used   • Alcohol use No   • Drug use: No   • Sexual activity: Yes     Partners: Female      Comment:      Other Topics Concern   • Not on file     Social History Narrative   • No narrative on file       SURGICAL HISTORY  History reviewed. No pertinent surgical history.    CURRENT MEDICATIONS  Home Medications     Reviewed by Henrry Brown R.N. (Registered Nurse) on 06/27/18 at 1939  Med List Status: Partial   Medication Last Dose Status   albuterol (VENTOLIN OR PROVENTIL) 108 (90 BASE) MCG/ACT AERS 1/11/2018 Active   ergocalciferol (DRISDOL) 41009 UNIT capsule Not taking Active   loratadine-pseudoephedrine (CLARITIN-D 24-HOUR)  MG per tablet  Active   Probiotic Product (PROBIOTIC PO)  "6/27/2018 Active                ALLERGIES  Allergies   Allergen Reactions   • Pcn [Penicillins] Hives and Swelling       PHYSICAL EXAM  VITAL SIGNS: /83   Pulse 80   Temp 37.1 °C (98.7 °F)   Resp 18   Ht 1.956 m (6' 5\") Comment: Stated  Wt 112 kg (246 lb 14.6 oz)   SpO2 99%   BMI 29.28 kg/m²   Constitutional: Well developed, Well nourished, No acute distress, Non-toxic appearance.   Extremities: Intact distal pulses examination right elbow, tenderness over the radial head, good flexion and extension but with pain. Rotation also causes pain. Neurovascular is intact skin is intact No cyanosis, No clubbing.   Musculoskeletal: Good range of motion in all major joints except right elbow as above Neurologic: Alert & oriented x 3, Normal motor function, Normal sensory function, No focal deficits noted.   Psychiatric: Affect normal, Judgment normal, Mood normal.       RADIOLOGY/PROCEDURES  DX-ELBOW-LIMITED 2- RIGHT   Final Result         1.  No acute traumatic bony injury.             COURSE & MEDICAL DECISION MAKING  Pertinent Labs & Imaging studies reviewed. (See chart for details)    Was doing some O martial arts procedure tonight that twisted his arm. Then when he went to get up, severe pain in the right elbow  X-ray demonstrates no acute abnormalities. I suspect he has a ligament injury. He'll be placed in a sling, Toradol for pain. I will have him follow up with the on-call orthopedic surgeon, Dr. george  Right elbow be put in a sling, Toradol for pain  FINAL IMPRESSION  1.   1. Strain of right elbow, initial encounter            2.   3.     Disposition  Discharge instructions are understood. This patient is to return if fever vomiting or no better in 12 hours. Follow up with the [facility] clinic or private physician. Information sheets on right knee ligament strain        Electronically signed by: Mitul Paul, 6/27/2018 8:04 PM      "

## 2018-07-30 ENCOUNTER — NON-PROVIDER VISIT (OUTPATIENT)
Dept: MEDICAL GROUP | Facility: PHYSICIAN GROUP | Age: 38
End: 2018-07-30
Payer: COMMERCIAL

## 2018-07-30 ENCOUNTER — TELEPHONE (OUTPATIENT)
Dept: MEDICAL GROUP | Facility: PHYSICIAN GROUP | Age: 38
End: 2018-07-30

## 2018-07-30 DIAGNOSIS — J30.2 SEASONAL ALLERGIC RHINITIS, UNSPECIFIED TRIGGER: ICD-10-CM

## 2018-07-30 PROCEDURE — 96372 THER/PROPH/DIAG INJ SC/IM: CPT | Performed by: NURSE PRACTITIONER

## 2018-07-30 RX ORDER — TRIAMCINOLONE ACETONIDE 40 MG/ML
40 INJECTION, SUSPENSION INTRA-ARTICULAR; INTRAMUSCULAR ONCE
Status: COMPLETED | OUTPATIENT
Start: 2018-07-30 | End: 2018-07-30

## 2018-07-30 RX ADMIN — TRIAMCINOLONE ACETONIDE 40 MG: 40 INJECTION, SUSPENSION INTRA-ARTICULAR; INTRAMUSCULAR at 16:27

## 2019-06-04 ENCOUNTER — OFFICE VISIT (OUTPATIENT)
Dept: MEDICAL GROUP | Facility: PHYSICIAN GROUP | Age: 39
End: 2019-06-04
Payer: COMMERCIAL

## 2019-06-04 VITALS
TEMPERATURE: 98.2 F | BODY MASS INDEX: 29.76 KG/M2 | HEIGHT: 77 IN | OXYGEN SATURATION: 95 % | HEART RATE: 83 BPM | SYSTOLIC BLOOD PRESSURE: 132 MMHG | DIASTOLIC BLOOD PRESSURE: 92 MMHG | RESPIRATION RATE: 16 BRPM | WEIGHT: 252 LBS

## 2019-06-04 DIAGNOSIS — Z00.00 WELLNESS EXAMINATION: ICD-10-CM

## 2019-06-04 DIAGNOSIS — J30.89 ENVIRONMENTAL AND SEASONAL ALLERGIES: ICD-10-CM

## 2019-06-04 DIAGNOSIS — M25.511 BILATERAL SHOULDER PAIN, UNSPECIFIED CHRONICITY: ICD-10-CM

## 2019-06-04 DIAGNOSIS — M25.512 BILATERAL SHOULDER PAIN, UNSPECIFIED CHRONICITY: ICD-10-CM

## 2019-06-04 PROCEDURE — 99395 PREV VISIT EST AGE 18-39: CPT | Performed by: NURSE PRACTITIONER

## 2019-06-04 ASSESSMENT — PATIENT HEALTH QUESTIONNAIRE - PHQ9: CLINICAL INTERPRETATION OF PHQ2 SCORE: 0

## 2019-06-05 NOTE — PROGRESS NOTES
Chief Complaint   Patient presents with   • Shoulder Pain     x 4 months    • Orders Needed     Requesting Labs/Referral        HISTORY OF PRESENT ILLNESS: Patient is a 38 y.o. male established patient who presents today to discuss the following issues:    Wellness examination  Is here for his annual wellness visit.  Is due for labs.    Environmental and seasonal allergies  Allergies have been terrible.  Would like a referral to an allergist.    Bilateral shoulder pain  Left shoulder has been bothering him much more recently.  Discussed options, and he will go to [facility] express or [facility] urgent care as needed.      Patient Active Problem List    Diagnosis Date Noted   • Wellness examination 06/10/2019   • Environmental and seasonal allergies 06/10/2019   • Tinnitus of both ears 01/11/2018   • Chronic sinusitis 01/11/2018   • Chronic nonintractable headache 01/11/2018   • Chronic low back pain 01/11/2018   • Chronic pain of left knee 01/11/2018   • Chronic left hip pain 01/11/2018   • Chronic pain of right knee 01/11/2018   • Chronic pain of right ankle 01/11/2018   • Multiple nevi 01/11/2018   • Asbestos exposure 01/11/2018   • History of MRSA infection 01/11/2018   • Bilateral shoulder pain 07/02/2014       Allergies:Pcn [penicillins]    Current Outpatient Prescriptions   Medication Sig Dispense Refill   • albuterol (VENTOLIN OR PROVENTIL) 108 (90 BASE) MCG/ACT AERS Inhale 2 Puffs by mouth every 6 hours as needed for Shortness of Breath. 8.5 g 3   • loratadine-pseudoephedrine (CLARITIN-D 24-HOUR)  MG per tablet Take 1 Tab by mouth every day. (Patient not taking: Reported on 6/4/2019) 30 Tab 3     No current facility-administered medications for this visit.        Social History   Substance Use Topics   • Smoking status: Never Smoker   • Smokeless tobacco: Never Used   • Alcohol use No       Family Status   Relation Status   • Mo Alive   • Fa Alive   • MUnc (Not Specified)   • MGFa (Not Specified)   • PGFa (Not  "Specified)     Family History   Problem Relation Age of Onset   • Hypertension Mother    • Cancer Father         colon   • Diabetes Father         pre-diabetes   • Heart Attack Maternal Uncle    • Heart Attack Maternal Grandfather    • Heart Attack Paternal Grandfather        Review of Systems:   Constitutional: Negative for fever, chills, weight loss and malaise/fatigue.   HENT: Negative for ear pain, nosebleeds, congestion, sore throat and neck pain.  Positive for seasonal allergies.   Eyes: Negative for blurred vision.   Respiratory: Negative for cough, sputum production, shortness of breath and wheezing.    Cardiovascular: Negative for chest pain, palpitations, orthopnea and leg swelling.   Gastrointestinal: Negative for heartburn, nausea, vomiting and abdominal pain.   Genitourinary: Negative for dysuria, urgency and frequency.   Musculoskeletal: Negative for myalgias, joint pain, and back pain.  Positive for left shoulder pain.  Skin: Negative for rash and itching.   Neurological: Negative for dizziness, tingling, tremors, sensory change, focal weakness and headaches.   Endo/Heme/Allergies: Does not bruise/bleed easily.   Psychiatric/Behavioral: Negative for depression, suicidal ideas and memory loss.  The patient is not nervous/anxious and does not have insomnia.    All other systems reviewed and are negative except as in HPI.    Exam:  /92   Pulse 83   Temp 36.8 °C (98.2 °F)   Resp 16   Ht 1.956 m (6' 5\")   Wt 114.3 kg (252 lb)   SpO2 95%   General:  Well nourished, well developed male in NAD  Head: Grossly normal.  Neck: Supple without JVD or bruit. Thyroid is not enlarged.  Pulmonary: Clear to ausculation. Normal effort. No rales, ronchi, or wheezing.  Cardiovascular: Regular rate and rhythm without murmur.   Abdomen:  Soft, nontender, nondistended.  Extremities: No clubbing, cyanosis, or edema.  Skin: Intact with no obvious rashes or lesions.  Neuro: Grossly intact.  Psych: Alert and oriented " x 3.  Mood and affect appropriate.    Medical decision-making and discussion: Devni is here today to discuss a few things.  Lab work was ordered and a referral was sent to an allergist.  He will follow-up here as needed.          Assessment/Plan:  1. Wellness examination  CBC WITH DIFFERENTIAL    Comp Metabolic Panel    Lipid Profile    VITAMIN D,25 HYDROXY   2. Environmental and seasonal allergies  REFERRAL TO ALLERGY   3. Bilateral shoulder pain, unspecified chronicity         Return if symptoms worsen or fail to improve.    Please note that this dictation was created using voice recognition software. I have made every reasonable attempt to correct obvious errors, but I expect that there are errors of grammar and possibly content that I did not discover before finalizing the note.

## 2019-06-10 PROBLEM — J30.89 ENVIRONMENTAL AND SEASONAL ALLERGIES: Status: ACTIVE | Noted: 2019-06-10

## 2019-06-10 PROBLEM — Z00.00 WELLNESS EXAMINATION: Status: ACTIVE | Noted: 2019-06-10

## 2019-06-10 NOTE — ASSESSMENT & PLAN NOTE
Left shoulder has been bothering him much more recently.  Discussed options, and he will go to [facility] express or [facility] urgent care as needed.

## 2019-06-11 ENCOUNTER — HOSPITAL ENCOUNTER (OUTPATIENT)
Dept: LAB | Facility: MEDICAL CENTER | Age: 39
End: 2019-06-11
Attending: NURSE PRACTITIONER
Payer: COMMERCIAL

## 2019-06-11 DIAGNOSIS — Z00.00 WELLNESS EXAMINATION: ICD-10-CM

## 2019-06-11 LAB
25(OH)D3 SERPL-MCNC: 13 NG/ML (ref 30–100)
ALBUMIN SERPL BCP-MCNC: 4.5 G/DL (ref 3.2–4.9)
ALBUMIN/GLOB SERPL: 1.5 G/DL
ALP SERPL-CCNC: 51 U/L (ref 30–99)
ALT SERPL-CCNC: 26 U/L (ref 2–50)
ANION GAP SERPL CALC-SCNC: 8 MMOL/L (ref 0–11.9)
AST SERPL-CCNC: 18 U/L (ref 12–45)
BASOPHILS # BLD AUTO: 0.5 % (ref 0–1.8)
BASOPHILS # BLD: 0.04 K/UL (ref 0–0.12)
BILIRUB SERPL-MCNC: 0.9 MG/DL (ref 0.1–1.5)
BUN SERPL-MCNC: 18 MG/DL (ref 8–22)
CALCIUM SERPL-MCNC: 9.7 MG/DL (ref 8.5–10.5)
CHLORIDE SERPL-SCNC: 104 MMOL/L (ref 96–112)
CHOLEST SERPL-MCNC: 239 MG/DL (ref 100–199)
CO2 SERPL-SCNC: 26 MMOL/L (ref 20–33)
CREAT SERPL-MCNC: 1.09 MG/DL (ref 0.5–1.4)
EOSINOPHIL # BLD AUTO: 0.16 K/UL (ref 0–0.51)
EOSINOPHIL NFR BLD: 1.9 % (ref 0–6.9)
ERYTHROCYTE [DISTWIDTH] IN BLOOD BY AUTOMATED COUNT: 41.6 FL (ref 35.9–50)
FASTING STATUS PATIENT QL REPORTED: NORMAL
GLOBULIN SER CALC-MCNC: 3.1 G/DL (ref 1.9–3.5)
GLUCOSE SERPL-MCNC: 91 MG/DL (ref 65–99)
HCT VFR BLD AUTO: 48 % (ref 42–52)
HDLC SERPL-MCNC: 42 MG/DL
HGB BLD-MCNC: 15.9 G/DL (ref 14–18)
IMM GRANULOCYTES # BLD AUTO: 0.04 K/UL (ref 0–0.11)
IMM GRANULOCYTES NFR BLD AUTO: 0.5 % (ref 0–0.9)
LDLC SERPL CALC-MCNC: 138 MG/DL
LYMPHOCYTES # BLD AUTO: 3.13 K/UL (ref 1–4.8)
LYMPHOCYTES NFR BLD: 37.4 % (ref 22–41)
MCH RBC QN AUTO: 30 PG (ref 27–33)
MCHC RBC AUTO-ENTMCNC: 33.1 G/DL (ref 33.7–35.3)
MCV RBC AUTO: 90.6 FL (ref 81.4–97.8)
MONOCYTES # BLD AUTO: 0.65 K/UL (ref 0–0.85)
MONOCYTES NFR BLD AUTO: 7.8 % (ref 0–13.4)
NEUTROPHILS # BLD AUTO: 4.36 K/UL (ref 1.82–7.42)
NEUTROPHILS NFR BLD: 51.9 % (ref 44–72)
NRBC # BLD AUTO: 0 K/UL
NRBC BLD-RTO: 0 /100 WBC
PLATELET # BLD AUTO: 280 K/UL (ref 164–446)
PMV BLD AUTO: 10.3 FL (ref 9–12.9)
POTASSIUM SERPL-SCNC: 3.7 MMOL/L (ref 3.6–5.5)
PROT SERPL-MCNC: 7.6 G/DL (ref 6–8.2)
RBC # BLD AUTO: 5.3 M/UL (ref 4.7–6.1)
SODIUM SERPL-SCNC: 138 MMOL/L (ref 135–145)
TRIGL SERPL-MCNC: 294 MG/DL (ref 0–149)
WBC # BLD AUTO: 8.4 K/UL (ref 4.8–10.8)

## 2019-06-11 PROCEDURE — 80061 LIPID PANEL: CPT

## 2019-06-11 PROCEDURE — 36415 COLL VENOUS BLD VENIPUNCTURE: CPT

## 2019-06-11 PROCEDURE — 85025 COMPLETE CBC W/AUTO DIFF WBC: CPT

## 2019-06-11 PROCEDURE — 80053 COMPREHEN METABOLIC PANEL: CPT

## 2019-06-11 PROCEDURE — 82306 VITAMIN D 25 HYDROXY: CPT

## 2019-06-13 RX ORDER — ERGOCALCIFEROL 1.25 MG/1
CAPSULE ORAL
Qty: 16 CAP | Refills: 0 | Status: SHIPPED | OUTPATIENT
Start: 2019-06-13 | End: 2020-08-20

## 2019-07-02 ENCOUNTER — TELEPHONE (OUTPATIENT)
Dept: MEDICAL GROUP | Facility: PHYSICIAN GROUP | Age: 39
End: 2019-07-02

## 2019-07-02 NOTE — TELEPHONE ENCOUNTER
----- Message from Shahnaz Goncalves M.D. sent at 7/2/2019 12:44 PM PDT -----  Mild elevation in cholesterol.   Low vitamin D.  Recommend 5000 IU per day.  Focus on consuming a dietary pattern that emphasizes intake of vegetables, fruits, and whole grains; includes low-fat dairy products, poultry, fish, legumes, nontropical vegetable oils and nuts; and limits intake of sweets, sugar-sweetened beverages and red meats.  Look up Mediterranean diet for more information.

## 2019-09-04 ENCOUNTER — OFFICE VISIT (OUTPATIENT)
Dept: MEDICAL GROUP | Facility: PHYSICIAN GROUP | Age: 39
End: 2019-09-04
Payer: COMMERCIAL

## 2019-09-04 VITALS
TEMPERATURE: 98.8 F | OXYGEN SATURATION: 97 % | WEIGHT: 243 LBS | SYSTOLIC BLOOD PRESSURE: 122 MMHG | HEART RATE: 78 BPM | BODY MASS INDEX: 28.69 KG/M2 | RESPIRATION RATE: 16 BRPM | DIASTOLIC BLOOD PRESSURE: 86 MMHG | HEIGHT: 77 IN

## 2019-09-04 DIAGNOSIS — J30.89 ENVIRONMENTAL AND SEASONAL ALLERGIES: ICD-10-CM

## 2019-09-04 PROCEDURE — 99214 OFFICE O/P EST MOD 30 MIN: CPT | Performed by: NURSE PRACTITIONER

## 2019-09-04 RX ORDER — TRIAMCINOLONE ACETONIDE 40 MG/ML
40 INJECTION, SUSPENSION INTRA-ARTICULAR; INTRAMUSCULAR ONCE
Status: DISCONTINUED | OUTPATIENT
Start: 2019-09-04 | End: 2019-09-04

## 2019-09-04 RX ORDER — EPINEPHRINE 0.3 MG/.3ML
INJECTION SUBCUTANEOUS
Refills: 0 | COMMUNITY
Start: 2019-07-18

## 2019-09-04 RX ORDER — TRIAMCINOLONE ACETONIDE 40 MG/ML
60 INJECTION, SUSPENSION INTRA-ARTICULAR; INTRAMUSCULAR ONCE
Status: COMPLETED | OUTPATIENT
Start: 2019-09-04 | End: 2019-09-04

## 2019-09-04 RX ADMIN — TRIAMCINOLONE ACETONIDE 60 MG: 40 INJECTION, SUSPENSION INTRA-ARTICULAR; INTRAMUSCULAR at 15:17

## 2019-09-04 NOTE — PROGRESS NOTES
Chief Complaint   Patient presents with   • Office Visit   • Injections     Requesting Kenalog        HISTORY OF PRESENT ILLNESS: Patient is a 39 y.o. male established patient who presents today to discuss the following issues:    Environmental and seasonal allergies  Allergies have been terrible.  He is getting weekly allergy shots, and he is taking more than one medication for allergies on a daily basis.  He would like to proceed with a Kenalog shot. Understands risks vs benefits.      Patient Active Problem List    Diagnosis Date Noted   • Wellness examination 06/10/2019   • Environmental and seasonal allergies 06/10/2019   • Tinnitus of both ears 01/11/2018   • Chronic sinusitis 01/11/2018   • Chronic nonintractable headache 01/11/2018   • Chronic low back pain 01/11/2018   • Chronic pain of left knee 01/11/2018   • Chronic left hip pain 01/11/2018   • Chronic pain of right knee 01/11/2018   • Chronic pain of right ankle 01/11/2018   • Multiple nevi 01/11/2018   • Asbestos exposure 01/11/2018   • History of MRSA infection 01/11/2018   • Bilateral shoulder pain 07/02/2014       Allergies:Pcn [penicillins]    Current Outpatient Medications   Medication Sig Dispense Refill   • EPINEPHrine (EPIPEN) 0.3 MG/0.3ML Solution Auto-injector solution for injection USE FOR SEVERE ALLERGIC REACTION  0   • loratadine-pseudoephedrine (CLARITIN-D 24-HOUR)  MG per tablet Take 1 Tab by mouth every day. 30 Tab 3   • albuterol (VENTOLIN OR PROVENTIL) 108 (90 BASE) MCG/ACT AERS Inhale 2 Puffs by mouth every 6 hours as needed for Shortness of Breath. 8.5 g 3   • loratadine/pseudo SR (CLARITIN D) 5-120 MG TABLET SR 12 HR Take  by mouth.     • ergocalciferol (DRISDOL) 96864 UNIT capsule Take 1 cap by mouth every day for 10 days, and then take 1 cap by mouth every Tuesday and Saturday until prescription is completed. (Patient not taking: Reported on 9/4/2019) 16 Cap 0     Current Facility-Administered Medications   Medication Dose  "Route Frequency Provider Last Rate Last Dose   • triamcinolone acetonide (KENALOG-40) injection 60 mg  60 mg Intramuscular Once Grant Owen, A.P.N.           Social History     Tobacco Use   • Smoking status: Never Smoker   • Smokeless tobacco: Never Used   Substance Use Topics   • Alcohol use: No     Alcohol/week: 0.0 oz   • Drug use: No       Family Status   Relation Name Status   • Mo  Alive   • Fa  Alive   • MUnc  (Not Specified)   • MGFa  (Not Specified)   • PGFa  (Not Specified)     Family History   Problem Relation Age of Onset   • Hypertension Mother    • Cancer Father         colon   • Diabetes Father         pre-diabetes   • Heart Attack Maternal Uncle    • Heart Attack Maternal Grandfather    • Heart Attack Paternal Grandfather        Review of Systems:   Constitutional: Negative for fever, chills, weight loss and malaise/fatigue.   HENT: Negative for ear pain, nosebleeds, congestion, sore throat and neck pain.  Positive for allergies.  Eyes: Negative for blurred vision.   Respiratory: Negative for cough, sputum production, shortness of breath and wheezing.    Cardiovascular: Negative for chest pain, palpitations, orthopnea and leg swelling.   Gastrointestinal: Negative for heartburn, nausea, vomiting and abdominal pain.   Genitourinary: Negative for dysuria, urgency and frequency.   Musculoskeletal: Negative for myalgias, joint pain, and back pain.  Skin: Negative for rash and itching.   Neurological: Negative for dizziness, tingling, tremors, sensory change, focal weakness and headaches.   Endo/Heme/Allergies: Does not bruise/bleed easily.   Psychiatric/Behavioral: Negative for depression, suicidal ideas and memory loss.  The patient is not nervous/anxious and does not have insomnia.    All other systems reviewed and are negative except as in HPI.    Exam:  Blood Pressure 122/86   Pulse 78   Temperature 37.1 °C (98.8 °F)   Respiration 16   Height 1.956 m (6' 5\")   Weight 110.2 kg (243 lb)   " Oxygen Saturation 97%   General:  Well nourished, well developed male in NAD  Head: Grossly normal.  Neck: Supple without JVD or bruit. Thyroid is not enlarged.  Pulmonary: Clear to ausculation. Normal effort. No rales, ronchi, or wheezing.  Cardiovascular: Regular rate and rhythm without murmur.   Abdomen:  Soft, nontender, nondistended.  Extremities: No clubbing, cyanosis, or edema.  Skin: Intact with no obvious rashes or lesions.  Neuro: Grossly intact.  Psych: Alert and oriented x 3.  Mood and affect appropriate.    Medical decision-making and discussion: Devin is here today to discuss allergies.  His medications were reviewed and he was given a Kenalog shot.  He will follow-up here as needed.    I have placed the below orders and discussed them with an approved delegating provider. The MA is performing the below orders under the direction of Dr. Goncalves, who have provided verbal consent for supervision.            Assessment/Plan:  1. Environmental and seasonal allergies  triamcinolone acetonide (KENALOG-40) injection 60 mg    DISCONTINUED: triamcinolone acetonide (KENALOG-40) injection 40 mg       Return if symptoms worsen or fail to improve.    Please note that this dictation was created using voice recognition software. I have made every reasonable attempt to correct obvious errors, but I expect that there are errors of grammar and possibly content that I did not discover before finalizing the note.

## 2019-09-04 NOTE — ASSESSMENT & PLAN NOTE
Allergies have been terrible.  He is getting weekly allergy shots, and he is taking more than one medication for allergies on a daily basis.  He would like to proceed with a Kenalog shot. Understands risks vs benefits.

## 2020-08-18 PROBLEM — E78.5 DYSLIPIDEMIA: Status: ACTIVE | Noted: 2020-08-18

## 2020-08-18 PROBLEM — E55.9 VITAMIN D DEFICIENCY: Status: ACTIVE | Noted: 2020-08-18

## 2020-08-18 NOTE — ASSESSMENT & PLAN NOTE
This is a chronic condition.   Latest Labs:   Lab Results   Component Value Date/Time    CHOLSTRLTOT 239 (H) 06/11/2019 06:35 AM     (H) 06/11/2019 06:35 AM    HDL 42 06/11/2019 06:35 AM    TRIGLYCERIDE 294 (H) 06/11/2019 06:35 AM      Medications: none currently  Diet/Exercise:   Family History of high cholesterol or heart disease?   Risk calculator: The 10-year ASCVD risk score (Dori KENNEDY Jr., et al., 2013) is: 2.1%

## 2020-08-18 NOTE — ASSESSMENT & PLAN NOTE
This is a  chronic condition.   Supplementation: recommend 4k iu daily, none currently.   Last Vitamin D level:   VIT D:   Lab Results   Component Value Date/Time    25HYDROXY 13 (L) 06/11/2019 0635     Patient denies any muscle aches or fatigue.

## 2020-08-20 ENCOUNTER — OFFICE VISIT (OUTPATIENT)
Dept: MEDICAL GROUP | Facility: PHYSICIAN GROUP | Age: 40
End: 2020-08-20
Payer: COMMERCIAL

## 2020-08-20 VITALS
BODY MASS INDEX: 31.33 KG/M2 | HEIGHT: 75 IN | SYSTOLIC BLOOD PRESSURE: 126 MMHG | WEIGHT: 252 LBS | RESPIRATION RATE: 12 BRPM | TEMPERATURE: 98.7 F | DIASTOLIC BLOOD PRESSURE: 82 MMHG | OXYGEN SATURATION: 94 % | HEART RATE: 81 BPM

## 2020-08-20 DIAGNOSIS — Z80.0 FH: COLON CANCER: ICD-10-CM

## 2020-08-20 DIAGNOSIS — M25.552 CHRONIC LEFT HIP PAIN: ICD-10-CM

## 2020-08-20 DIAGNOSIS — J30.89 ENVIRONMENTAL AND SEASONAL ALLERGIES: ICD-10-CM

## 2020-08-20 DIAGNOSIS — Z91.018 MULTIPLE FOOD ALLERGIES: ICD-10-CM

## 2020-08-20 DIAGNOSIS — M25.511 BILATERAL SHOULDER PAIN, UNSPECIFIED CHRONICITY: ICD-10-CM

## 2020-08-20 DIAGNOSIS — M25.512 BILATERAL SHOULDER PAIN, UNSPECIFIED CHRONICITY: ICD-10-CM

## 2020-08-20 DIAGNOSIS — H93.13 TINNITUS OF BOTH EARS: ICD-10-CM

## 2020-08-20 DIAGNOSIS — G89.29 CHRONIC LEFT HIP PAIN: ICD-10-CM

## 2020-08-20 DIAGNOSIS — Z86.14 HISTORY OF MRSA INFECTION: ICD-10-CM

## 2020-08-20 DIAGNOSIS — E55.9 VITAMIN D DEFICIENCY: ICD-10-CM

## 2020-08-20 DIAGNOSIS — Z00.00 GENERAL MEDICAL EXAM: ICD-10-CM

## 2020-08-20 DIAGNOSIS — E78.5 DYSLIPIDEMIA: ICD-10-CM

## 2020-08-20 DIAGNOSIS — Z12.5 SPECIAL SCREENING FOR MALIGNANT NEOPLASM OF PROSTATE: ICD-10-CM

## 2020-08-20 DIAGNOSIS — M54.50 CHRONIC LOW BACK PAIN, UNSPECIFIED BACK PAIN LATERALITY, UNSPECIFIED WHETHER SCIATICA PRESENT: ICD-10-CM

## 2020-08-20 DIAGNOSIS — G89.29 CHRONIC LOW BACK PAIN, UNSPECIFIED BACK PAIN LATERALITY, UNSPECIFIED WHETHER SCIATICA PRESENT: ICD-10-CM

## 2020-08-20 PROBLEM — R51.9 CHRONIC NONINTRACTABLE HEADACHE: Status: RESOLVED | Noted: 2018-01-11 | Resolved: 2020-08-20

## 2020-08-20 PROBLEM — J32.9 CHRONIC SINUSITIS: Status: RESOLVED | Noted: 2018-01-11 | Resolved: 2020-08-20

## 2020-08-20 PROCEDURE — 99396 PREV VISIT EST AGE 40-64: CPT | Performed by: PHYSICIAN ASSISTANT

## 2020-08-20 ASSESSMENT — PATIENT HEALTH QUESTIONNAIRE - PHQ9: CLINICAL INTERPRETATION OF PHQ2 SCORE: 0

## 2020-08-20 ASSESSMENT — FIBROSIS 4 INDEX: FIB4 SCORE: 0.52

## 2020-08-20 NOTE — ASSESSMENT & PLAN NOTE
History of MRSA- occasionally gets flare ups on his legs. He pays attention to make sure no infections.

## 2020-08-20 NOTE — PROGRESS NOTES
CC:Diagnoses of Vitamin D deficiency, Dyslipidemia, Environmental and seasonal allergies, Chronic low back pain, unspecified back pain laterality, unspecified whether sciatica present, Chronic left hip pain, History of MRSA infection, Bilateral shoulder pain, unspecified chronicity, FH: colon cancer, Multiple food allergies, General medical exam, Special screening for malignant neoplasm of prostate, and Tinnitus of both ears were pertinent to this visit.    Devin Carrasco is a 40 y.o. male presents for a routine preventive health exam.    Health Maintenance: Completed  [facility] colon cancer twice and has active prostate cancer. Colonoscopy was normal per patient.     Vitamin D deficiency  This is a  chronic condition.   Supplementation: recommend 4k iu daily, none currently.   Last Vitamin D level:   VIT D:   Lab Results   Component Value Date/Time    25HYDROXY 13 (L) 06/11/2019 0635     Patient denies any muscle aches or fatigue.       Dyslipidemia  This is a chronic condition.   Latest Labs:   Lab Results   Component Value Date/Time    CHOLSTRLTOT 239 (H) 06/11/2019 06:35 AM     (H) 06/11/2019 06:35 AM    HDL 42 06/11/2019 06:35 AM    TRIGLYCERIDE 294 (H) 06/11/2019 06:35 AM      Medications: none currently  Diet/Exercise:   Family History of high cholesterol or heart disease?   Risk calculator: The 10-year ASCVD risk score (Dori MARCIA Jr., et al., 2013) is: 2.1%       Environmental and seasonal allergies  Occasionally has allergy induced cough and reactive airway disease. Currently getting allergy shots and this is helping greatly, doing this for about a year now. Takes generic zyrtec.     Chronic low back pain  [facility], [facility]     Chronic left hip pain  [facility]- [facility] of [facility] fracture.     History of MRSA infection  History of MRSA- occasionally gets flare ups on his legs. He pays attention to make sure no infections.      Bilateral shoulder pain  [facility]- history of bilateral shoulder dislocation.     FH: colon cancer  Col[facility]oscopy done [facility] dad colon cancer twice and has active prostate cancer. Colonoscopy was normal per patient.     Multiple food allergies  Allergies to turkey and lamb. Saw GI specialist in past for this. Better with avoidance of these foods.     Tinnitus of both ears  [facility].       Screening/Preventative Topics:  Advanced directive: N/a   Osteoporosis Screen/ DEXA: n/a   Diabetes Screening: getting FBS   AAA Screening: n/a  Aspirin Use: None currently    Diet: improved in last 2 months, hx high cholesterol   Exercise: used to do MMA, needs to get back into routine, recently hired on by police department. Will start regular exercise regimen again.     Substance Abuse: none  Safe in relationship yes  Sun protection used.    Cancer screening  Lung Cancer Screening: N/a      Patient Active Problem List    Diagnosis Date Noted   • FH: colon cancer 08/20/2020   • Multiple food allergies 08/20/2020   • Vitamin D deficiency 08/18/2020   • Dyslipidemia 08/18/2020   • Wellness examination 06/10/2019   • Environmental and seasonal allergies 06/10/2019   • Tinnitus of both ears 01/11/2018   • Chronic low back pain 01/11/2018   • Chronic pain of left knee 01/11/2018   • Chronic left hip pain 01/11/2018   • Chronic pain of right knee 01/11/2018   • Chronic pain of right ankle 01/11/2018   • Multiple nevi 01/11/2018   • Asbestos exposure 01/11/2018   • History of MRSA infection 01/11/2018   • Bilateral shoulder pain 07/02/2014      Allergies:Pcn [penicillins]    Current Outpatient Medications   Medication Sig Dispense Refill   • EPINEPHrine (EPIPEN) 0.3 MG/0.3ML Solution Auto-injector solution for injection USE FOR SEVERE ALLERGIC REACTION  0   • albuterol (VENTOLIN OR PROVENTIL) 108 (90 BASE) MCG/ACT AERS Inhale 2 Puffs by mouth every 6 hours as needed for Shortness of Breath. 8.5 g 3     No current  "facility-administered medications for this visit.        Social History     Tobacco Use   • Smoking status: Never Smoker   • Smokeless tobacco: Never Used   Substance Use Topics   • Alcohol use: No     Alcohol/week: 0.0 oz   • Drug use: No     Social History     Social History Narrative   • Not on file       Family History   Problem Relation Age of Onset   • Hypertension Mother    • Cancer Father         colon   • Diabetes Father         pre-diabetes   • Heart Attack Maternal Uncle    • Heart Attack Maternal Grandfather    • Heart Attack Paternal Grandfather        Review of Systems:    Constitutional: No Fevers, Chills  Eyes: No vision changes  ENT: No hearing changes  Resp: No Shortness of breath  CV: No Chest pain  GI: No Nausea/Vomiting  MSK: No weakness  Skin: No rashes  Neuro: No Headaches  Psych: No Suicidal ideations    All remaining systems reviewed and found to be negative, except as stated above.    Exam:    /82   Pulse 81   Temp 37.1 °C (98.7 °F) (Temporal)   Resp 12   Ht 1.905 m (6' 3\")   Wt 114.3 kg (252 lb)   SpO2 94%  Body mass index is 31.5 kg/m².    General:  Well nourished, well developed male in NAD  HENT: Atraumatic, normocephalic  EYES: Extraocular movements intact, pupils equal and reactive to light  NECK: Supple, FROM  CHEST: No deformities, Equal chest expansion  RESP: Unlabored, no stridor or audible wheeze  ABD: Soft, Nontender, Non-Distended  Extremities: No Clubbing, Cyanosis, or Edema  Skin: Warm/dry, without rashes  Neuro: A/O x 4, CN 2-12 Grossly intact, Motor/sensory grossly intact  Psych: Normal behavior, normal affect    LABS: Last years labs Results reviewed and discussed with the patient, questions answered. Due for annual labs.       Assessment/Plan:  1. Vitamin D deficiency  Recommend 4000 IUs daily.  Chronic condition.  - VITAMIN D,25 HYDROXY; Future    2. Dyslipidemia  Chronic condition.  Patient has improved his diet over the last 2 months.  Recheck lipids now, " discussed risks of uncontrolled cholesterol.  He would like to try to get it down with lifestyle modifications but has not completely opposed to medication management as well if needed.    3. Environmental and seasonal allergies  Getting allergy shots for the last year.  Has helped significantly.  Also taking generic Zyrtec.    4. Chronic low back pain, unspecified back pain laterality, unspecified whether sciatica present  5. Chronic left hip pain  [facility]  [facility].  6. History of MRSA infection  No acute infections currently.    7. Bilateral shoulder pain, unspecified chronicity  [facility].    8. FH: colon cancer  Father has colon cancer x2, patient just got a colonoscopy [facility] the [facility], normal per patient.    9. Multiple food allergies  Allergic to lamb and turkey per patient.    10. General medical exam  - CBC WITH DIFFERENTIAL; Future  - Comp Metabolic Panel; Future  - Lipid Profile; Future  - TSH; Future    11. Special screening for malignant neoplasm of prostate  - PROSTATE SPECIFIC AG SCREENING; Future    12. Tinnitus of both ears  Chronic condition.  Stable no changes.      Patient up-to-date on preventative health maintenance examinations and vaccinations.  Age-appropriate anticipatory guidance provided today.    Preventive visit in 1 year, sooner as needed for any concerns.     Please note that this dictation was created using voice recognition software. I have worked with consultants from the vendor as well as technical experts from [employer] to optimize the interface. I have made every reasonable attempt to correct obvious errors, but I expect that there are errors of grammar and possibly content that I did not discover before finalizing the note.

## 2020-08-20 NOTE — ASSESSMENT & PLAN NOTE
[facility]oscopy done [facility] colon cancer twice and has active prostate cancer. Colonoscopy was normal per patient.

## 2020-08-20 NOTE — ASSESSMENT & PLAN NOTE
Allergies to turkey and lamb. Saw GI specialist in past for this. Better with avoidance of these foods.

## 2020-08-20 NOTE — ASSESSMENT & PLAN NOTE
Occasionally has allergy induced cough and reactive airway disease. Currently getting allergy shots and this is helping greatly, doing this for about a year now. Takes generic zyrtec.

## 2020-08-26 ENCOUNTER — HOSPITAL ENCOUNTER (OUTPATIENT)
Dept: LAB | Facility: MEDICAL CENTER | Age: 40
End: 2020-08-26
Attending: PHYSICIAN ASSISTANT
Payer: COMMERCIAL

## 2020-08-26 DIAGNOSIS — Z12.5 SPECIAL SCREENING FOR MALIGNANT NEOPLASM OF PROSTATE: ICD-10-CM

## 2020-08-26 DIAGNOSIS — Z00.00 GENERAL MEDICAL EXAM: ICD-10-CM

## 2020-08-26 DIAGNOSIS — E55.9 VITAMIN D DEFICIENCY: ICD-10-CM

## 2020-08-26 LAB
25(OH)D3 SERPL-MCNC: 18 NG/ML (ref 30–100)
ALBUMIN SERPL BCP-MCNC: 4.5 G/DL (ref 3.2–4.9)
ALBUMIN/GLOB SERPL: 1.6 G/DL
ALP SERPL-CCNC: 55 U/L (ref 30–99)
ALT SERPL-CCNC: 60 U/L (ref 2–50)
ANION GAP SERPL CALC-SCNC: 13 MMOL/L (ref 7–16)
AST SERPL-CCNC: 28 U/L (ref 12–45)
BASOPHILS # BLD AUTO: 0.5 % (ref 0–1.8)
BASOPHILS # BLD: 0.03 K/UL (ref 0–0.12)
BILIRUB SERPL-MCNC: 1 MG/DL (ref 0.1–1.5)
BUN SERPL-MCNC: 18 MG/DL (ref 8–22)
CALCIUM SERPL-MCNC: 9.6 MG/DL (ref 8.5–10.5)
CHLORIDE SERPL-SCNC: 103 MMOL/L (ref 96–112)
CHOLEST SERPL-MCNC: 233 MG/DL (ref 100–199)
CO2 SERPL-SCNC: 25 MMOL/L (ref 20–33)
CREAT SERPL-MCNC: 0.91 MG/DL (ref 0.5–1.4)
EOSINOPHIL # BLD AUTO: 0.14 K/UL (ref 0–0.51)
EOSINOPHIL NFR BLD: 2.3 % (ref 0–6.9)
ERYTHROCYTE [DISTWIDTH] IN BLOOD BY AUTOMATED COUNT: 38.7 FL (ref 35.9–50)
FASTING STATUS PATIENT QL REPORTED: NORMAL
GLOBULIN SER CALC-MCNC: 2.9 G/DL (ref 1.9–3.5)
GLUCOSE SERPL-MCNC: 98 MG/DL (ref 65–99)
HCT VFR BLD AUTO: 46.3 % (ref 42–52)
HDLC SERPL-MCNC: 39 MG/DL
HGB BLD-MCNC: 15.9 G/DL (ref 14–18)
IMM GRANULOCYTES # BLD AUTO: 0.01 K/UL (ref 0–0.11)
IMM GRANULOCYTES NFR BLD AUTO: 0.2 % (ref 0–0.9)
LDLC SERPL CALC-MCNC: 165 MG/DL
LYMPHOCYTES # BLD AUTO: 1.88 K/UL (ref 1–4.8)
LYMPHOCYTES NFR BLD: 31.3 % (ref 22–41)
MCH RBC QN AUTO: 30.1 PG (ref 27–33)
MCHC RBC AUTO-ENTMCNC: 34.3 G/DL (ref 33.7–35.3)
MCV RBC AUTO: 87.7 FL (ref 81.4–97.8)
MONOCYTES # BLD AUTO: 0.42 K/UL (ref 0–0.85)
MONOCYTES NFR BLD AUTO: 7 % (ref 0–13.4)
NEUTROPHILS # BLD AUTO: 3.53 K/UL (ref 1.82–7.42)
NEUTROPHILS NFR BLD: 58.7 % (ref 44–72)
NRBC # BLD AUTO: 0 K/UL
NRBC BLD-RTO: 0 /100 WBC
PLATELET # BLD AUTO: 225 K/UL (ref 164–446)
PMV BLD AUTO: 10.7 FL (ref 9–12.9)
POTASSIUM SERPL-SCNC: 4.3 MMOL/L (ref 3.6–5.5)
PROT SERPL-MCNC: 7.4 G/DL (ref 6–8.2)
PSA SERPL-MCNC: 1.1 NG/ML (ref 0–4)
RBC # BLD AUTO: 5.28 M/UL (ref 4.7–6.1)
SODIUM SERPL-SCNC: 141 MMOL/L (ref 135–145)
TRIGL SERPL-MCNC: 147 MG/DL (ref 0–149)
TSH SERPL DL<=0.005 MIU/L-ACNC: 0.86 UIU/ML (ref 0.38–5.33)
WBC # BLD AUTO: 6 K/UL (ref 4.8–10.8)

## 2020-08-26 PROCEDURE — 84443 ASSAY THYROID STIM HORMONE: CPT

## 2020-08-26 PROCEDURE — 84153 ASSAY OF PSA TOTAL: CPT

## 2020-08-26 PROCEDURE — 80061 LIPID PANEL: CPT

## 2020-08-26 PROCEDURE — 80053 COMPREHEN METABOLIC PANEL: CPT

## 2020-08-26 PROCEDURE — 36415 COLL VENOUS BLD VENIPUNCTURE: CPT

## 2020-08-26 PROCEDURE — 82306 VITAMIN D 25 HYDROXY: CPT

## 2020-08-26 PROCEDURE — 85025 COMPLETE CBC W/AUTO DIFF WBC: CPT

## 2020-08-31 ENCOUNTER — TELEPHONE (OUTPATIENT)
Dept: MEDICAL GROUP | Facility: PHYSICIAN GROUP | Age: 40
End: 2020-08-31

## 2020-08-31 DIAGNOSIS — R79.89 ELEVATED LFTS: ICD-10-CM

## 2020-08-31 NOTE — TELEPHONE ENCOUNTER
----- Message from Shirley Mccartney P.A.-C. sent at 8/31/2020 12:23 PM PDT -----  Please call patient about their results.     Results showed:     1 your liver enzymes is slightly elevated, no history of the same.  I would recommend we repeat this in 4 to 6 weeks.  There are many reasons that can cause this, most the time the body will normalize them after a few weeks.    Your cholesterol is still elevated, triglycerides are much improved but LDL is much higher.  I would recommend a trial of a medication for this, however I would want a repeat your liver enzymes first to make sure they normalize, because cholesterol medications can make your liver enzymes go up as well.    Vitamin D is low.  Please start supplementing vitamin D 2000 IUs daily.      Please make a follow-up with me in about 4 weeks after repeat your liver enzymes to discuss these labs in detail.    If you have any questions or concerns, do not hesitate to contact me or my Medical Assistant. Thank you for your time today.     Respectfully,     Shirley Mccartney PA-C

## 2020-11-24 ENCOUNTER — TELEPHONE (OUTPATIENT)
Dept: MEDICAL GROUP | Facility: PHYSICIAN GROUP | Age: 40
End: 2020-11-24

## 2020-11-24 DIAGNOSIS — Z20.822 CLOSE EXPOSURE TO COVID-19 VIRUS: ICD-10-CM

## 2020-11-24 NOTE — TELEPHONE ENCOUNTER
Devin called the clinic.    Devin stated that he would like to get COVID-19 tested.    Devin is NOT having any current symptoms.

## 2021-01-04 ENCOUNTER — OFFICE VISIT (OUTPATIENT)
Dept: URGENT CARE | Facility: PHYSICIAN GROUP | Age: 41
End: 2021-01-04
Payer: COMMERCIAL

## 2021-01-04 VITALS
TEMPERATURE: 98.6 F | WEIGHT: 250 LBS | RESPIRATION RATE: 16 BRPM | DIASTOLIC BLOOD PRESSURE: 80 MMHG | BODY MASS INDEX: 29.52 KG/M2 | SYSTOLIC BLOOD PRESSURE: 146 MMHG | OXYGEN SATURATION: 97 % | HEART RATE: 86 BPM | HEIGHT: 77 IN

## 2021-01-04 DIAGNOSIS — H61.21 IMPACTED CERUMEN OF RIGHT EAR: ICD-10-CM

## 2021-01-04 PROCEDURE — 99213 OFFICE O/P EST LOW 20 MIN: CPT | Performed by: PHYSICIAN ASSISTANT

## 2021-01-04 ASSESSMENT — ENCOUNTER SYMPTOMS
CONSTITUTIONAL NEGATIVE: 1
CARDIOVASCULAR NEGATIVE: 1
HEADACHES: 0
WHEEZING: 0
NEUROLOGICAL NEGATIVE: 1
ABDOMINAL PAIN: 0
SORE THROAT: 1
NECK PAIN: 0
GASTROINTESTINAL NEGATIVE: 1
SINUS PAIN: 0
COUGH: 1
SHORTNESS OF BREATH: 0

## 2021-01-04 ASSESSMENT — FIBROSIS 4 INDEX: FIB4 SCORE: 0.66

## 2021-01-04 NOTE — PROGRESS NOTES
Subjective:      Devin Carrasco is a 40 y.o. male who presents with Otalgia (R ear pain, R side sore throat x2 days)            Otalgia   There is pain in the right ear. This is a new problem. The current episode started in the past 7 days. The problem occurs constantly. The problem has been gradually worsening. There has been no fever. The fever has been present for less than 1 day. Associated symptoms include coughing and a sore throat. Pertinent negatives include no abdominal pain, ear discharge, headaches or neck pain. He has tried NSAIDs for the symptoms. The treatment provided mild relief. There is no history of a chronic ear infection or a tympanostomy tube.       PMH:  has a past medical history of Allergy, Hip fracture (HCC), Right shoulder pain (7/2/2014), and Seasonal allergies (3/3/2014). He also has no past medical history of ASTHMA, Diabetes, or Hypertension.  MEDS:   Current Outpatient Medications:   •  EPINEPHrine (EPIPEN) 0.3 MG/0.3ML Solution Auto-injector solution for injection, USE FOR SEVERE ALLERGIC REACTION, Disp: , Rfl: 0  •  albuterol (VENTOLIN OR PROVENTIL) 108 (90 BASE) MCG/ACT AERS, Inhale 2 Puffs by mouth every 6 hours as needed for Shortness of Breath. (Patient not taking: Reported on 1/4/2021), Disp: 8.5 g, Rfl: 3  ALLERGIES:   Allergies   Allergen Reactions   • Penicillins Hives, Swelling and Anaphylaxis     SURGHX: No past surgical history on file.  SOCHX:  reports that he has never smoked. He has never used smokeless tobacco. He reports that he does not drink alcohol or use drugs.  FH: family history includes Cancer in his father; Diabetes in his father; Heart Attack in his maternal grandfather, maternal uncle, and paternal grandfather; Hypertension in his mother.    Review of Systems   Constitutional: Negative.    HENT: Positive for ear pain, sore throat and tinnitus (Chronic). Negative for congestion, ear discharge and sinus pain.    Respiratory: Positive for cough. Negative for  "shortness of breath and wheezing.    Cardiovascular: Negative.    Gastrointestinal: Negative.  Negative for abdominal pain.   Musculoskeletal: Negative for neck pain.   Neurological: Negative.  Negative for headaches.       Medications, Allergies, and current problem list reviewed today in Epic     Objective:     /80 (BP Location: Right arm, Patient Position: Sitting, BP Cuff Size: Adult)   Pulse 86   Temp 37 °C (98.6 °F) (Temporal)   Resp 16   Ht 1.956 m (6' 5\")   Wt 113.4 kg (250 lb)   SpO2 97%   BMI 29.65 kg/m²      Physical Exam  Vitals signs and nursing note reviewed.   Constitutional:       General: He is not in acute distress.     Appearance: He is well-developed. He is not ill-appearing, toxic-appearing or diaphoretic.   HENT:      Head: Normocephalic and atraumatic.      Right Ear: Tympanic membrane, ear canal and external ear normal. There is impacted cerumen.      Left Ear: Tympanic membrane, ear canal and external ear normal. There is no impacted cerumen.      Nose: Nose normal. No congestion or rhinorrhea.      Mouth/Throat:      Pharynx: No oropharyngeal exudate or posterior oropharyngeal erythema.   Eyes:      General:         Right eye: No discharge.         Left eye: No discharge.      Conjunctiva/sclera: Conjunctivae normal.   Neck:      Musculoskeletal: Normal range of motion and neck supple.   Cardiovascular:      Rate and Rhythm: Normal rate and regular rhythm.      Heart sounds: Normal heart sounds. No murmur.   Pulmonary:      Effort: Pulmonary effort is normal. No respiratory distress.      Breath sounds: Normal breath sounds. No wheezing.   Chest:      Chest wall: No tenderness.   Lymphadenopathy:      Cervical: No cervical adenopathy.   Skin:     General: Skin is warm and dry.   Neurological:      Mental Status: He is alert and oriented to person, place, and time.   Psychiatric:         Behavior: Behavior normal.         Thought Content: Thought content normal.         " Judgment: Judgment normal.                 Assessment/Plan:         1. Impacted cerumen of right ear       Procedure: Cerumen Removal  Risks and benefits of procedure discussed  Cerumen removed with curette and lavage after softening agent instilled by me with the help of my MA  Patient tolerated well  Post procedure exam with clear canal and normal TM    OTC meds and conservative measures as discussed    Return to clinic or go to ED if symptoms worsen or persist. Indications for ED discussed at length. Patient/Parent/Guardian voices understanding. Follow-up with your primary care provider in 3-5 days. Red flag symptoms discussed. All side effects of medication discussed including allergic response, GI upset, tendon injury, rash, sedation etc.    Please note that this dictation was created using voice recognition software. I have made every reasonable attempt to correct obvious errors, but I expect that there are errors of grammar and possibly content that I did not discover before finalizing the note.

## 2021-02-19 ENCOUNTER — OFFICE VISIT (OUTPATIENT)
Dept: MEDICAL GROUP | Facility: PHYSICIAN GROUP | Age: 41
End: 2021-02-19
Payer: COMMERCIAL

## 2021-02-19 VITALS
OXYGEN SATURATION: 93 % | HEART RATE: 100 BPM | BODY MASS INDEX: 30.06 KG/M2 | DIASTOLIC BLOOD PRESSURE: 72 MMHG | SYSTOLIC BLOOD PRESSURE: 128 MMHG | HEIGHT: 77 IN | WEIGHT: 254.6 LBS | TEMPERATURE: 99.3 F

## 2021-02-19 DIAGNOSIS — M25.552 CHRONIC LEFT HIP PAIN: ICD-10-CM

## 2021-02-19 DIAGNOSIS — M54.50 CHRONIC LOW BACK PAIN, UNSPECIFIED BACK PAIN LATERALITY, UNSPECIFIED WHETHER SCIATICA PRESENT: ICD-10-CM

## 2021-02-19 DIAGNOSIS — E78.5 DYSLIPIDEMIA: ICD-10-CM

## 2021-02-19 DIAGNOSIS — G89.29 CHRONIC PAIN OF LEFT KNEE: ICD-10-CM

## 2021-02-19 DIAGNOSIS — R51.9 CHRONIC NONINTRACTABLE HEADACHE, UNSPECIFIED HEADACHE TYPE: ICD-10-CM

## 2021-02-19 DIAGNOSIS — G89.29 CHRONIC NONINTRACTABLE HEADACHE, UNSPECIFIED HEADACHE TYPE: ICD-10-CM

## 2021-02-19 DIAGNOSIS — G89.29 CHRONIC LOW BACK PAIN, UNSPECIFIED BACK PAIN LATERALITY, UNSPECIFIED WHETHER SCIATICA PRESENT: ICD-10-CM

## 2021-02-19 DIAGNOSIS — M25.562 CHRONIC PAIN OF LEFT KNEE: ICD-10-CM

## 2021-02-19 DIAGNOSIS — G89.29 CHRONIC LEFT HIP PAIN: ICD-10-CM

## 2021-02-19 DIAGNOSIS — E55.9 VITAMIN D DEFICIENCY: ICD-10-CM

## 2021-02-19 PROCEDURE — 99214 OFFICE O/P EST MOD 30 MIN: CPT | Performed by: PHYSICIAN ASSISTANT

## 2021-02-19 ASSESSMENT — FIBROSIS 4 INDEX: FIB4 SCORE: 0.66

## 2021-02-19 ASSESSMENT — PATIENT HEALTH QUESTIONNAIRE - PHQ9: CLINICAL INTERPRETATION OF PHQ2 SCORE: 0

## 2021-02-19 NOTE — ASSESSMENT & PLAN NOTE
This is a chronic condition.   Latest Labs:   Lab Results   Component Value Date/Time    CHOLSTRLTOT 233 (H) 08/26/2020 12:00 AM     (H) 08/26/2020 12:00 AM    HDL 39 (A) 08/26/2020 12:00 AM    TRIGLYCERIDE 147 08/26/2020 12:00 AM      Medications: none    Risk calculator: The 10-year ASCVD risk score (Dori KENNEDY Jr., et al., 2013) is: 3%

## 2021-02-19 NOTE — ASSESSMENT & PLAN NOTE
This is a chronic condition.   Supplementation: recommend 4000 IU   Last Vitamin D level:   VIT D:   Lab Results   Component Value Date/Time    25HYDROXY 18 (L) 08/26/2020 0000     Patient denies any muscle aches or fatigue.

## 2021-02-19 NOTE — ASSESSMENT & PLAN NOTE
"Low back pain started in 2004. Injured after a fall of 15 feet, landed on right side, landed on piece of metal right on hip. No imaging following event. Given cane at the time and told to \"walk it off\". Was told later he likely fractured his hip. Dislocated his pelvis as well.     Now sometimes feels that his hip is \"dislocated\" and burning sensation. Sometimes shoots down his leg. Can sometimes feeling burning pain into his achilles as well.   "

## 2021-02-19 NOTE — ASSESSMENT & PLAN NOTE
Patient sustained many injuries when he was in the . He has since suffered from chronic headaches. Gets them everyday. Started [id number]. Started when he was in the . 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 Within couple days he started to have headaches. Headaches will vary- sometimes gets nauseous from it. Pressure behind eyes, light sensitivity. Last hours. After the headaches resolve will feel his shoulders get tight. Has other type of headaches- sometime temporal headaches. Never had MRI or imaging brain.

## 2021-02-19 NOTE — PROGRESS NOTES
"CC:   Chief Complaint   Patient presents with   • Establish Care   • Head Ache          HISTORY OF PRESENT ILLNESS: Patient is a 40 y.o. male established patient who presents today to acute complaints     Health Maintenance: Completed    Dyslipidemia  This is a chronic condition.   Latest Labs:   Lab Results   Component Value Date/Time    CHOLSTRLTOT 233 (H) 08/26/2020 12:00 AM     (H) 08/26/2020 12:00 AM    HDL 39 (A) 08/26/2020 12:00 AM    TRIGLYCERIDE 147 08/26/2020 12:00 AM      Medications: none    Risk calculator: The 10-year ASCVD risk score (Dori MARCIA Jr., et al., 2013) is: 3%       Vitamin D deficiency  This is a chronic condition.   Supplementation: recommend 4000 IU   Last Vitamin D level:   VIT D:   Lab Results   Component Value Date/Time    25HYDROXY 18 (L) 08/26/2020 0000     Patient denies any muscle aches or fatigue.       Chronic low back pain  Low back pain started in 2004. Injured after a fall of 15 feet, landed on right side, landed on piece of metal right on hip. No imaging following event. Given cane at the time and told to \"walk it off\". Was told later he likely fractured his hip. Dislocated his pelvis as well.     Now sometimes feels that his hip is \"dislocated\" and burning sensation. Sometimes shoots down his leg. Can sometimes feeling burning pain into his achilles as well.     Chronic headaches  Patient sustained many injuries when he was in the . He has since suffered from chronic headaches. Gets them everyday. Started [id number]. Started when he was in the . 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 Within couple days he started to have headaches. Headaches will vary- sometimes gets nauseous from it. Pressure behind eyes, light sensitivity. Last hours. After the headaches resolve will feel his shoulders get tight. Has other type of headaches- sometime temporal headaches. Never had " MRI or imaging brain.     Chronic pain of left knee  Chronic aching pain since 2004, since injury noted above. Progressively getting worse.       Patient Active Problem List    Diagnosis Date Noted   • FH: colon cancer 08/20/2020   • Multiple food allergies 08/20/2020   • Vitamin D deficiency 08/18/2020   • Dyslipidemia 08/18/2020   • Wellness examination 06/10/2019   • Environmental and seasonal allergies 06/10/2019   • Tinnitus of both ears 01/11/2018   • Chronic headaches 01/11/2018   • Chronic low back pain 01/11/2018   • Chronic pain of left knee 01/11/2018   • Chronic left hip pain 01/11/2018   • Chronic pain of right knee 01/11/2018   • Chronic pain of right ankle 01/11/2018   • Multiple nevi 01/11/2018   • Asbestos exposure 01/11/2018   • History of MRSA infection 01/11/2018   • Bilateral shoulder pain 07/02/2014      Allergies:Penicillins    Current Outpatient Medications   Medication Sig Dispense Refill   • EPINEPHrine (EPIPEN) 0.3 MG/0.3ML Solution Auto-injector solution for injection USE FOR SEVERE ALLERGIC REACTION  0   • albuterol (VENTOLIN OR PROVENTIL) 108 (90 BASE) MCG/ACT AERS Inhale 2 Puffs by mouth every 6 hours as needed for Shortness of Breath. 8.5 g 3     No current facility-administered medications for this visit.       Social History     Tobacco Use   • Smoking status: Never Smoker   • Smokeless tobacco: Never Used   Substance Use Topics   • Alcohol use: No     Alcohol/week: 0.0 oz   • Drug use: No     Social History     Social History Narrative   • Not on file       Family History   Problem Relation Age of Onset   • Hypertension Mother    • Cancer Father         colon   • Diabetes Father         pre-diabetes   • Heart Attack Maternal Uncle    • Heart Attack Maternal Grandfather    • Heart Attack Paternal Grandfather        Review of Systems:    Constitutional: No Fevers, Chills  Eyes: No vision changes  ENT: No hearing changes  Resp: No Shortness of breath  CV: No Chest pain  GI: No  "Nausea/Vomiting  MSK: No weakness  Skin: No rashes  Neuro: No Headaches  Psych: No Suicidal ideations    All remaining systems reviewed and found to be negative, except as stated above.    Exam:    /72   Pulse 100   Temp 37.4 °C (99.3 °F) (Temporal)   Ht 1.956 m (6' 5\")   Wt 115 kg (254 lb 9.6 oz)   SpO2 93%  Body mass index is 30.19 kg/m².    General:  Well nourished, well developed male in NAD  HENT: Atraumatic, normocephalic  EYES: Extraocular movements intact  NECK: Supple, FROM  CHEST: No deformities, Equal chest expansion  RESP: Unlabored, no stridor or audible wheeze  HEART: Regular Rate and rhythm.   Extremities: No Clubbing, Cyanosis, or Edema  Skin: Warm/dry, without rashes  Back: Tender to palpation left parapspinous and gluteus ledy. Hip Tender to palpation over greater trochanter.   Knee: tender to palpation anterior knee, no erythema or edema.   Neuro: A/O x 4, due to COVID-19- did not have patient remove face mask to test cranial nerves.  Motor/sensory grossly intact  Psych: Normal behavior, normal affect      Lab review:  Labs are reviewed and discussed with a patient  Lab Results   Component Value Date/Time    CHOLSTRLTOT 233 (H) 08/26/2020 12:00 AM     (H) 08/26/2020 12:00 AM    HDL 39 (A) 08/26/2020 12:00 AM    TRIGLYCERIDE 147 08/26/2020 12:00 AM       Lab Results   Component Value Date/Time    SODIUM 141 08/26/2020 12:00 AM    POTASSIUM 4.3 08/26/2020 12:00 AM    CHLORIDE 103 08/26/2020 12:00 AM    CO2 25 08/26/2020 12:00 AM    GLUCOSE 98 08/26/2020 12:00 AM    BUN 18 08/26/2020 12:00 AM    CREATININE 0.91 08/26/2020 12:00 AM     Lab Results   Component Value Date/Time    ALKPHOSPHAT 55 08/26/2020 12:00 AM    ASTSGOT 28 08/26/2020 12:00 AM    ALTSGPT 60 (H) 08/26/2020 12:00 AM    TBILIRUBIN 1.0 08/26/2020 12:00 AM      No results found for: HBA1C  No results found for: TSH  No results found for: FREET4    Lab Results   Component Value Date/Time    WBC 6.0 08/26/2020 12:00 AM "    RBC 5.28 08/26/2020 12:00 AM    HEMOGLOBIN 15.9 08/26/2020 12:00 AM    HEMATOCRIT 46.3 08/26/2020 12:00 AM    MCV 87.7 08/26/2020 12:00 AM    MCH 30.1 08/26/2020 12:00 AM    MCHC 34.3 08/26/2020 12:00 AM    MPV 10.7 08/26/2020 12:00 AM    NEUTSPOLYS 58.70 08/26/2020 12:00 AM    LYMPHOCYTES 31.30 08/26/2020 12:00 AM    MONOCYTES 7.00 08/26/2020 12:00 AM    EOSINOPHILS 2.30 08/26/2020 12:00 AM    BASOPHILS 0.50 08/26/2020 12:00 AM          Assessment/Plan:  1. Dyslipidemia  - Lipid Profile; Future  Chronic condition, patient updated me that he may have eaten before his last lab test.  Going to repeat before follow-up visit to look for improvement.    2. Vitamin D deficiency  Recommend vitamin D supplementation 4000 - 5000 IUs daily.  We will repeat with annual labs.    3. Chronic low back pain, unspecified back pain laterality, unspecified whether sciatica present  Chronic low back pain since sustaining an injury when he was in the  in 2004.  They will about 15 feet and landed on a piece of metal.  He will occasionally have sciatica pain.  Usually feels that over the left lower back into the buttocks region and wraps around into his hip.  Has had many x-rays but none in the last few years.    4. Chronic nonintractable headache, unspecified headache type  - MR-BRAIN-WITH & W/O; Future  Chronic headache starting back in 2003 after a possible biohazard exposure in the .  Headache started shortly after that.  Has a variety of headaches some sounding like typical migraine headaches and other described as temporal or tension headaches.  He has not had imaging or an MRI of his brain.  Recommend we do so now.  His headaches are occurring on a daily basis.  He does not usually have a bad migraine except for about once a month.  Discussed potential prophylactic medications to reduce the frequency of his headaches, would want to get imaging first.  Rule out CADASIL- could trial triptans for acute migraines.  Consider amitriptyline as prophylactic.     5. Chronic pain of left knee  - REFERRAL TO ORTHOPEDICS  Left knee is starting to become more problematic for him.  Again this was induced from an injury in 2004 while in the .  Patient has chronic aching pain in his left knee.  Going to refer him to orthopedics for consult.  [facility]  He does not believe he has had an MRI of his knee.    6. Chronic left hip pain  - REFERRAL TO ORTHOPEDICS  Chronic condition.  Seems to be getting progressively worse.  Again going to refer him to orthopedics, at like him to see Dr. Boom Mei to assess both his knee and his hip.  He has [facility] many [facility] in years past.  He does not believe he has had MRI.    Follow-up: Return in about 4 weeks (around 3/19/2021) for Follow up on labs and imaging.    Please note that this dictation was created using voice recognition software. I have made every reasonable attempt to correct obvious errors, but I expect that there are errors of grammar and possibly content that I did not discover before finalizing the note.

## 2021-02-24 ENCOUNTER — APPOINTMENT (OUTPATIENT)
Dept: MEDICAL GROUP | Facility: PHYSICIAN GROUP | Age: 41
End: 2021-02-24
Payer: COMMERCIAL

## 2021-03-14 ENCOUNTER — PATIENT MESSAGE (OUTPATIENT)
Dept: MEDICAL GROUP | Facility: PHYSICIAN GROUP | Age: 41
End: 2021-03-14

## 2021-03-14 DIAGNOSIS — F40.240 CLAUSTROPHOBIA: ICD-10-CM

## 2021-03-15 RX ORDER — DIAZEPAM 5 MG/1
TABLET ORAL
Qty: 2 TABLET | Refills: 0 | Status: SHIPPED | OUTPATIENT
Start: 2021-03-15 | End: 2021-03-19

## 2021-03-15 NOTE — PROGRESS NOTES
Patient needs sedative before his MRI of his brain.   reviewed and appropriate.  Will call in diazepam 5 mg quantity of 2.  Instructed patient to take 1 - 20 minutes before his MRI.  Patient's wife is going to drive him to and from the exam.

## 2021-03-17 ENCOUNTER — APPOINTMENT (OUTPATIENT)
Dept: RADIOLOGY | Facility: MEDICAL CENTER | Age: 41
End: 2021-03-17
Attending: PHYSICIAN ASSISTANT
Payer: COMMERCIAL

## 2021-03-17 ENCOUNTER — TELEPHONE (OUTPATIENT)
Dept: MEDICAL GROUP | Facility: PHYSICIAN GROUP | Age: 41
End: 2021-03-17

## 2021-03-17 DIAGNOSIS — R51.9 CHRONIC NONINTRACTABLE HEADACHE, UNSPECIFIED HEADACHE TYPE: ICD-10-CM

## 2021-03-17 DIAGNOSIS — G89.29 CHRONIC NONINTRACTABLE HEADACHE, UNSPECIFIED HEADACHE TYPE: ICD-10-CM

## 2021-03-17 PROCEDURE — 700117 HCHG RX CONTRAST REV CODE 255: Performed by: PHYSICIAN ASSISTANT

## 2021-03-17 PROCEDURE — 70553 MRI BRAIN STEM W/O & W/DYE: CPT

## 2021-03-17 PROCEDURE — A9576 INJ PROHANCE MULTIPACK: HCPCS | Performed by: PHYSICIAN ASSISTANT

## 2021-03-17 RX ADMIN — GADOTERIDOL 20 ML: 279.3 INJECTION, SOLUTION INTRAVENOUS at 11:01

## 2021-03-17 NOTE — TELEPHONE ENCOUNTER
----- Message from Shirley Mccartney P.A.-C. sent at 3/17/2021  1:50 PM PDT -----  Please call patient about their results.     Results showed: Good news, MRI of the brain is normal.  We will discuss the results in full detail at your next follow-up visit on March 19, 2021.    Thank you,    Shirley Mccartney PA-C

## 2021-03-19 ENCOUNTER — TELEMEDICINE (OUTPATIENT)
Dept: MEDICAL GROUP | Facility: PHYSICIAN GROUP | Age: 41
End: 2021-03-19
Payer: COMMERCIAL

## 2021-03-19 VITALS — WEIGHT: 254 LBS | HEIGHT: 77 IN | BODY MASS INDEX: 29.99 KG/M2 | RESPIRATION RATE: 12 BRPM

## 2021-03-19 DIAGNOSIS — M25.562 CHRONIC PAIN OF LEFT KNEE: ICD-10-CM

## 2021-03-19 DIAGNOSIS — G89.29 CHRONIC NONINTRACTABLE HEADACHE, UNSPECIFIED HEADACHE TYPE: ICD-10-CM

## 2021-03-19 DIAGNOSIS — R51.9 CHRONIC NONINTRACTABLE HEADACHE, UNSPECIFIED HEADACHE TYPE: ICD-10-CM

## 2021-03-19 DIAGNOSIS — G89.29 CHRONIC LEFT HIP PAIN: ICD-10-CM

## 2021-03-19 DIAGNOSIS — M25.552 CHRONIC LEFT HIP PAIN: ICD-10-CM

## 2021-03-19 DIAGNOSIS — G89.29 CHRONIC PAIN OF LEFT KNEE: ICD-10-CM

## 2021-03-19 PROCEDURE — 99213 OFFICE O/P EST LOW 20 MIN: CPT | Mod: 95,CR | Performed by: PHYSICIAN ASSISTANT

## 2021-03-19 RX ORDER — AMITRIPTYLINE HYDROCHLORIDE 10 MG/1
10 TABLET, FILM COATED ORAL DAILY
Qty: 30 TABLET | Refills: 2 | Status: SHIPPED | OUTPATIENT
Start: 2021-03-19 | End: 2021-06-24

## 2021-03-19 RX ORDER — SUMATRIPTAN 25 MG/1
TABLET, FILM COATED ORAL
Qty: 15 TABLET | Refills: 2 | Status: SHIPPED | OUTPATIENT
Start: 2021-03-19 | End: 2021-08-17

## 2021-03-19 ASSESSMENT — FIBROSIS 4 INDEX: FIB4 SCORE: 0.66

## 2021-03-19 NOTE — ASSESSMENT & PLAN NOTE
MRI brain was reviewed with patient. Negative results other than some mucosal thickening.     Still having headaches daily. Had migraine yesterday. Usually migraine headaches are treated well with caffeine and Excedrin migraine.     Discussed prophylactic medication.

## 2021-03-19 NOTE — PROGRESS NOTES
Virtual Visit: Established Patient   This visit was conducted via Zoom using secure and encrypted videoconferencing technology. The patient was in a private location in the state of [place].    The patient's identity was confirmed and verbal consent was obtained for this virtual visit.    Subjective:   CC:   Chief Complaint   Patient presents with   • Results     MRI   • Headache       Devin Carrasco is a 40 y.o. male presenting for evaluation and management of:    Health Maintenance: reviewed.     Chronic headaches  MRI brain was reviewed with patient. Negative results other than some mucosal thickening.     Still having headaches daily. Had migraine yesterday. Usually migraine headaches are treated well with caffeine and Excedrin migraine.     Discussed prophylactic medication.     Chronic left hip pain  Last visit referred to ortho- has appointment next month.     Chronic pain of left knee  Last visit referred to ortho- has appointment next month.       ROS   Denies any recent fevers or chills. No nausea or vomiting. No chest pains or shortness of breath.     Allergies   Allergen Reactions   • Penicillins Hives, Swelling and Anaphylaxis       Current medicines (including changes today)  Current Outpatient Medications   Medication Sig Dispense Refill   • amitriptyline (ELAVIL) 10 MG Tab Take 1 tablet by mouth every day. 30 tablet 2   • SUMAtriptan (IMITREX) 25 MG Tab tablet Take 1 tab PO at onset of HA, may repeat x 1 in 2 hours if HA persists. 15 tablet 2   • EPINEPHrine (EPIPEN) 0.3 MG/0.3ML Solution Auto-injector solution for injection USE FOR SEVERE ALLERGIC REACTION  0   • albuterol (VENTOLIN OR PROVENTIL) 108 (90 BASE) MCG/ACT AERS Inhale 2 Puffs by mouth every 6 hours as needed for Shortness of Breath. 8.5 g 3     No current facility-administered medications for this visit.       Patient Active Problem List    Diagnosis Date Noted   • FH: colon cancer 08/20/2020   • Multiple food allergies 08/20/2020   •  "Vitamin D deficiency 08/18/2020   • Dyslipidemia 08/18/2020   • Wellness examination 06/10/2019   • Environmental and seasonal allergies 06/10/2019   • Tinnitus of both ears 01/11/2018   • Chronic headaches 01/11/2018   • Chronic low back pain 01/11/2018   • Chronic pain of left knee 01/11/2018   • Chronic left hip pain 01/11/2018   • Chronic pain of right knee 01/11/2018   • Chronic pain of right ankle 01/11/2018   • Multiple nevi 01/11/2018   • Asbestos exposure 01/11/2018   • History of MRSA infection 01/11/2018   • Bilateral shoulder pain 07/02/2014       Family History   Problem Relation Age of Onset   • Hypertension Mother    • Cancer Father         colon   • Diabetes Father         pre-diabetes   • Heart Attack Maternal Uncle    • Heart Attack Maternal Grandfather    • Heart Attack Paternal Grandfather        He  has a past medical history of Allergy, Hip fracture (HCC), Right shoulder pain (7/2/2014), and Seasonal allergies (3/3/2014). He also has no past medical history of ASTHMA, Diabetes, or Hypertension.  He  has no past surgical history on file.       Objective:   Resp 12   Ht 1.956 m (6' 5\")   Wt 115 kg (254 lb)   BMI 30.12 kg/m²     Physical Exam:  Constitutional: Alert, no distress, well-groomed.  Skin: No rashes in visible areas.  Eye: Round. Conjunctiva clear, lids normal. No icterus.   ENMT: Lips pink without lesions, good dentition, moist mucous membranes. Phonation normal.  Neck: No masses, no thyromegaly. Moves freely without pain.  Respiratory: Unlabored respiratory effort, no cough or audible wheeze  Psych: Alert and oriented x3, normal affect and mood.       Assessment and Plan:   The following treatment plan was discussed:     1. Chronic nonintractable headache, unspecified headache type  Chronic condition, patient continues to have daily headaches.  Would like to try prophylactic medication.  Going to trial amitriptyline 10 mg at night to start.  Discussed potential side effects of the " medication.  In addition I had like to try Imitrex as an abortive medication.  Discussed with patient on how to take this medication first thing at his onset of a migraine headache, can take 1 more 2 hours later if headache persists.  Would like him to not use this medication more than 2 to 3 times a week.  We will follow-up in 4 weeks on this matter.  2. Chronic left hip pain  3. Chronic pain of left knee  Follow-up with orthopedics as scheduled next month.  Other orders  - amitriptyline (ELAVIL) 10 MG Tab; Take 1 tablet by mouth every day.  Dispense: 30 tablet; Refill: 2  - SUMAtriptan (IMITREX) 25 MG Tab tablet; Take 1 tab PO at onset of HA, may repeat x 1 in 2 hours if HA persists.  Dispense: 15 tablet; Refill: 2        Follow-up: Return in about 4 weeks (around 4/16/2021) for Follow up on labs.        The patient verbalized agreement and understanding of current plan. All questions and concerns were addressed at time of visit.    Please note that this dictation was created using voice recognition software. I have made every reasonable attempt to correct obvious errors, but I expect that there are errors of grammar and possibly content that I did not discover before finalizing the note.

## 2021-06-16 ENCOUNTER — TELEMEDICINE (OUTPATIENT)
Dept: MEDICAL GROUP | Facility: PHYSICIAN GROUP | Age: 41
End: 2021-06-16
Payer: COMMERCIAL

## 2021-06-16 VITALS — BODY MASS INDEX: 27.75 KG/M2 | RESPIRATION RATE: 12 BRPM | HEIGHT: 77 IN | WEIGHT: 235 LBS

## 2021-06-16 DIAGNOSIS — G89.29 CHRONIC LEFT HIP PAIN: ICD-10-CM

## 2021-06-16 DIAGNOSIS — M25.562 CHRONIC PAIN OF LEFT KNEE: ICD-10-CM

## 2021-06-16 DIAGNOSIS — G89.29 CHRONIC PAIN OF LEFT KNEE: ICD-10-CM

## 2021-06-16 DIAGNOSIS — M25.552 CHRONIC LEFT HIP PAIN: ICD-10-CM

## 2021-06-16 PROCEDURE — 99213 OFFICE O/P EST LOW 20 MIN: CPT | Mod: 95 | Performed by: PHYSICIAN ASSISTANT

## 2021-06-16 ASSESSMENT — FIBROSIS 4 INDEX: FIB4 SCORE: 0.66

## 2021-06-16 NOTE — LETTER
June 16, 2021    To Whom It May Concern,      Devin Carrasco is a patient of mine since August 20, 2020. He is well-known to me. It is my professional recommendation that the patient be allowed to walk 2 miles for his physical assessment for his occupation due to chronic ongoing medical conditions.                                Shirley Mccartney P.A.-C.

## 2021-06-16 NOTE — LETTER
[facility]  Shirley Mccartney P.A.-C.  [address]  [place] [address]  Fax: [phone]   Authorization for Release/Disclosure of   Protected Health Information   Name: DEVIN CARRASCO : 1980 SSN: [id number]   Address: [address] [place] [address] [place] [address] Phone:    [phone] (home)    I authorize the entity listed below to release/disclose the PHI below to:   [facility]/Shirley Mccartney P.A.-C. and Shirley Mccartney P.A.-C.   Provider or Entity Name:  [facility]   Address   City, State, Zip   Phone:      Fax:     Reason for request: continuity of care   Information to be released:    [  ] LAST COLONOSCOPY,  including any PATH REPORT and follow-up  [  ] LAST FIT/COLOGUARD RESULT [  ] LAST DEXA  [  ] LAST MAMMOGRAM  [  ] LAST PAP  [  ] LAST LABS [  ] RETINA EXAM REPORT  [  ] IMMUNIZATION RECORDS  [ X ] Release all info      [  ] Check here and initial the line next to each item to release ALL health information INCLUDING  _____ Care and treatment for drug and / or alcohol abuse  _____ HIV testing, infection status, or AIDS  _____ Genetic Testing    DATES OF SERVICE OR TIME PERIOD TO BE DISCLOSED: _____________  I understand and acknowledge that:  * This Authorization may be revoked at any time by you in writing, except if your health information has already been used or disclosed.  * Your health information that will be used or disclosed as a result of you signing this authorization could be re-disclosed by the recipient. If this occurs, your re-disclosed health information may no longer be protected by State or Federal laws.  * You may refuse to sign this Authorization. Your refusal will not affect your ability to obtain treatment.  * This Authorization becomes effective upon signing and will  on (date) __________.      If no date is indicated, this Authorization will  one (1) year from the signature date.    Name: Devin Carrasco    Signature:   Date:     2021       PLEASE FAX  REQUESTED RECORDS BACK TO: [phone]

## 2021-06-16 NOTE — PROGRESS NOTES
Virtual Visit: Established Patient   This visit was conducted via Zoom using secure and encrypted videoconferencing technology. The patient was in a private location in the state of [place].    The patient's identity was confirmed and verbal consent was obtained for this virtual visit.    Subjective:   CC:   Chief Complaint   Patient presents with   • Letter for School/Work     Fitness Evaluation        Devin Carrasco is a 40 y.o. male presenting for evaluation and management of:     Health Maintenance:     Chronic pain of left knee  For patient's work he needs to run 1 1/2 miles or walk 2 miles for his physical evaluation.     He saw [facility] for knee pain- had MRI- left knee arthritis- they suggested cortisone injection- and discussed arthroscopic surgery.     Chronic left hip pain  [facility] did MRI- had abnormalities causing hip impingement, labrum tear and cartilage damage. [facility] plans to do cortisone injections and most likely surgery.       ROS   Denies any recent fevers or chills. No nausea or vomiting. No chest pains or shortness of breath.     Allergies   Allergen Reactions   • Penicillins Hives, Swelling and Anaphylaxis       Current medicines (including changes today)  Current Outpatient Medications   Medication Sig Dispense Refill   • amitriptyline (ELAVIL) 10 MG Tab Take 1 tablet by mouth every day. 30 tablet 2   • SUMAtriptan (IMITREX) 25 MG Tab tablet Take 1 tab PO at onset of HA, may repeat x 1 in 2 hours if HA persists. 15 tablet 2   • EPINEPHrine (EPIPEN) 0.3 MG/0.3ML Solution Auto-injector solution for injection USE FOR SEVERE ALLERGIC REACTION  0   • albuterol (VENTOLIN OR PROVENTIL) 108 (90 BASE) MCG/ACT AERS Inhale 2 Puffs by mouth every 6 hours as needed for Shortness of Breath. 8.5 g 3     No current facility-administered medications for this visit.       Patient Active Problem List    Diagnosis Date Noted   • FH: colon cancer 08/20/2020   • Multiple food allergies 08/20/2020   • Vitamin D deficiency  "08/18/2020   • Dyslipidemia 08/18/2020   • Wellness examination 06/10/2019   • Environmental and seasonal allergies 06/10/2019   • Tinnitus of both ears 01/11/2018   • Chronic headaches 01/11/2018   • Chronic low back pain 01/11/2018   • Chronic pain of left knee 01/11/2018   • Chronic left hip pain 01/11/2018   • Chronic pain of right knee 01/11/2018   • Chronic pain of right ankle 01/11/2018   • Multiple nevi 01/11/2018   • Asbestos exposure 01/11/2018   • History of MRSA infection 01/11/2018   • Bilateral shoulder pain 07/02/2014       Family History   Problem Relation Age of Onset   • Hypertension Mother    • Cancer Father         colon   • Diabetes Father         pre-diabetes   • Heart Attack Maternal Uncle    • Heart Attack Maternal Grandfather    • Heart Attack Paternal Grandfather        He  has a past medical history of Allergy, Hip fracture (HCC), Right shoulder pain (7/2/2014), and Seasonal allergies (3/3/2014). He also has no past medical history of ASTHMA, Diabetes, or Hypertension.  He  has no past surgical history on file.       Objective:   Resp 12   Ht 1.956 m (6' 5\")   Wt 107 kg (235 lb)   BMI 27.87 kg/m²     Physical Exam:  Constitutional: Alert, no distress, well-groomed.  Skin: No rashes in visible areas.  Eye: Round. Conjunctiva clear, lids normal. No icterus.   ENMT: Lips pink without lesions, good dentition, moist mucous membranes. Phonation normal.  Neck: No masses, no thyromegaly. Moves freely without pain.  Respiratory: Unlabored respiratory effort, no cough or audible wheeze  Psych: Alert and oriented x3, normal affect and mood.       Assessment and Plan:   The following treatment plan was discussed:     1. Chronic pain of left knee  2. Chronic left hip pain  Requesting consult notes from the rock.  Patient had an MRI of his hip and his knee.  The plan is to proceed with cortisone shots of his hip and his knee patient will most likely need surgical intervention on both his knee and " hip.  MRI shows significant arthritis in the knee per patient, his hip had a labrum tear, hip impingement, in addition to significant cartilage damage.  Requesting MRIs as well.    Patient needs letter today for his work.  I am in agreement that the patient should not run 1-1/2 miles for his physical assessment at work due to his chronic joint conditions.  Recommend patient walk 2 miles instead.      Follow-up: Return if symptoms worsen or fail to improve.        The patient verbalized agreement and understanding of current plan. All questions and concerns were addressed at time of visit.    Please note that this dictation was created using voice recognition software. I have made every reasonable attempt to correct obvious errors, but I expect that there are errors of grammar and possibly content that I did not discover before finalizing the note.

## 2021-06-16 NOTE — ASSESSMENT & PLAN NOTE
[facility] did MRI- had abnormalities causing hip impingement, labrum tear and cartilage damage. [facility] plans to do cortisone injections and most likely surgery.

## 2021-06-16 NOTE — ASSESSMENT & PLAN NOTE
For patient's work he needs to run 1 1/2 miles or walk 2 miles for his physical evaluation.     He saw [facility] for knee pain- had MRI- left knee arthritis- they suggested cortisone injection- and discussed arthroscopic surgery.

## 2021-06-24 RX ORDER — AMITRIPTYLINE HYDROCHLORIDE 10 MG/1
10 TABLET, FILM COATED ORAL DAILY
Qty: 30 TABLET | Refills: 2 | Status: SHIPPED | OUTPATIENT
Start: 2021-06-24 | End: 2021-08-17

## 2021-08-17 ENCOUNTER — OFFICE VISIT (OUTPATIENT)
Dept: MEDICAL GROUP | Facility: PHYSICIAN GROUP | Age: 41
End: 2021-08-17
Payer: COMMERCIAL

## 2021-08-17 VITALS
TEMPERATURE: 98 F | HEART RATE: 81 BPM | WEIGHT: 252 LBS | RESPIRATION RATE: 12 BRPM | DIASTOLIC BLOOD PRESSURE: 82 MMHG | BODY MASS INDEX: 29.76 KG/M2 | HEIGHT: 77 IN | SYSTOLIC BLOOD PRESSURE: 128 MMHG | OXYGEN SATURATION: 97 %

## 2021-08-17 DIAGNOSIS — J30.89 ENVIRONMENTAL AND SEASONAL ALLERGIES: ICD-10-CM

## 2021-08-17 PROCEDURE — 99213 OFFICE O/P EST LOW 20 MIN: CPT | Performed by: PHYSICIAN ASSISTANT

## 2021-08-17 RX ORDER — TRIAMCINOLONE ACETONIDE 40 MG/ML
40 INJECTION, SUSPENSION INTRA-ARTICULAR; INTRAMUSCULAR ONCE
Status: COMPLETED | OUTPATIENT
Start: 2021-08-17 | End: 2021-08-17

## 2021-08-17 RX ORDER — COVID-19 MOLECULAR TEST ASSAY
KIT MISCELLANEOUS
COMMUNITY
Start: 2021-07-08 | End: 2021-08-17

## 2021-08-17 RX ADMIN — TRIAMCINOLONE ACETONIDE 40 MG: 40 INJECTION, SUSPENSION INTRA-ARTICULAR; INTRAMUSCULAR at 15:14

## 2021-08-17 ASSESSMENT — FIBROSIS 4 INDEX: FIB4 SCORE: 0.67

## 2021-08-17 NOTE — PROGRESS NOTES
CC:   Chief Complaint   Patient presents with   • Seasonal Allergies          HISTORY OF PRESENT ILLNESS: Patient is a 41 y.o. male established patient who presents today acute complaint of:     Health Maintenance: Completed    Environmental and seasonal allergies  He's been having significant allergies since July. Smoke aggravating his symptoms   Claritin- D in AM and every 2 hours with benadryl. At 7 at night, taking zyrtec. No nasal sprays- had issues with sinus infection in past.   Was going to allergist, but had severe reactions and no benefit.       Patient Active Problem List    Diagnosis Date Noted   • FH: colon cancer 08/20/2020   • Multiple food allergies 08/20/2020   • Vitamin D deficiency 08/18/2020   • Dyslipidemia 08/18/2020   • Wellness examination 06/10/2019   • Environmental and seasonal allergies 06/10/2019   • Tinnitus of both ears 01/11/2018   • Chronic headaches 01/11/2018   • Chronic low back pain 01/11/2018   • Chronic pain of left knee 01/11/2018   • Chronic left hip pain 01/11/2018   • Chronic pain of right knee 01/11/2018   • Chronic pain of right ankle 01/11/2018   • Multiple nevi 01/11/2018   • Asbestos exposure 01/11/2018   • History of MRSA infection 01/11/2018   • Bilateral shoulder pain 07/02/2014      Allergies:Penicillins    Current Outpatient Medications   Medication Sig Dispense Refill   • EPINEPHrine (EPIPEN) 0.3 MG/0.3ML Solution Auto-injector solution for injection USE FOR SEVERE ALLERGIC REACTION  0   • albuterol (VENTOLIN OR PROVENTIL) 108 (90 BASE) MCG/ACT AERS Inhale 2 Puffs by mouth every 6 hours as needed for Shortness of Breath. 8.5 g 3     No current facility-administered medications for this visit.       Social History     Tobacco Use   • Smoking status: Never Smoker   • Smokeless tobacco: Never Used   Vaping Use   • Vaping Use: Never used   Substance Use Topics   • Alcohol use: No     Alcohol/week: 0.0 oz   • Drug use: No     Social History     Social History  "Narrative   • Not on file       Family History   Problem Relation Age of Onset   • Hypertension Mother    • Cancer Father         colon   • Diabetes Father         pre-diabetes   • Heart Attack Maternal Uncle    • Heart Attack Maternal Grandfather    • Heart Attack Paternal Grandfather        Review of Systems:    Constitutional: No Fevers, Chills  Eyes: No vision changes  ENT: No hearing changes  Resp: No Shortness of breath  CV: No Chest pain  GI: No Nausea/Vomiting  MSK: No weakness  Skin: No rashes  Neuro: No Headaches  Psych: No Suicidal ideations    All remaining systems reviewed and found to be negative, except as stated above.    Exam:    /82   Pulse 81   Temp 36.7 °C (98 °F) (Temporal)   Resp 12   Ht 1.956 m (6' 5\")   Wt 114 kg (252 lb)   SpO2 97%  Body mass index is 29.88 kg/m².    General:  Well nourished, well developed male in NAD  HENT: Atraumatic, normocephalic  EYES: Extraocular movements intact  NECK: Supple, FROM  CHEST: No deformities, Equal chest expansion  RESP: Unlabored, no stridor or audible wheeze  HEART: Regular Rate and rhythm.   Extremities: No Clubbing, Cyanosis, or Edema  Skin: Warm/dry, without rashes  Neuro: A/O x 4, due to COVID-19- did not have patient remove face mask to test cranial nerves.  Motor/sensory grossly intact  Psych: Normal behavior, normal affect      Lab review:  Labs are reviewed and discussed with a patient  Lab Results   Component Value Date/Time    CHOLSTRLTOT 233 (H) 08/26/2020 12:00 AM     (H) 08/26/2020 12:00 AM    HDL 39 (A) 08/26/2020 12:00 AM    TRIGLYCERIDE 147 08/26/2020 12:00 AM       Lab Results   Component Value Date/Time    SODIUM 141 08/26/2020 12:00 AM    POTASSIUM 4.3 08/26/2020 12:00 AM    CHLORIDE 103 08/26/2020 12:00 AM    CO2 25 08/26/2020 12:00 AM    GLUCOSE 98 08/26/2020 12:00 AM    BUN 18 08/26/2020 12:00 AM    CREATININE 0.91 08/26/2020 12:00 AM     Lab Results   Component Value Date/Time    ALKPHOSPHAT 55 08/26/2020 12:00 " AM    ASTSGOT 28 08/26/2020 12:00 AM    ALTSGPT 60 (H) 08/26/2020 12:00 AM    TBILIRUBIN 1.0 08/26/2020 12:00 AM      No results found for: HBA1C  No results found for: TSH  No results found for: FREET4    Lab Results   Component Value Date/Time    WBC 6.0 08/26/2020 12:00 AM    RBC 5.28 08/26/2020 12:00 AM    HEMOGLOBIN 15.9 08/26/2020 12:00 AM    HEMATOCRIT 46.3 08/26/2020 12:00 AM    MCV 87.7 08/26/2020 12:00 AM    MCH 30.1 08/26/2020 12:00 AM    MCHC 34.3 08/26/2020 12:00 AM    MPV 10.7 08/26/2020 12:00 AM    NEUTSPOLYS 58.70 08/26/2020 12:00 AM    LYMPHOCYTES 31.30 08/26/2020 12:00 AM    MONOCYTES 7.00 08/26/2020 12:00 AM    EOSINOPHILS 2.30 08/26/2020 12:00 AM    BASOPHILS 0.50 08/26/2020 12:00 AM          Assessment/Plan:  1. Environmental and seasonal allergies  Patient having severe allergies, unsuccessful with over-the-counter regimens including Claritin-D in the morning, taking Benadryl every 2 hours and a Zyrtec at night.  Appropriate to give patient Kenalog shot in office today.  We did discuss maybe a trial of Singulair next summer before his allergies start to see if this is more effective in controlling some of his symptoms.  Patient has had Kenalog shot in the past without issue.  Other orders  - triamcinolone acetonide (KENALOG-40) injection 40 mg       Follow-up: Return if symptoms worsen or fail to improve.    Please note that this dictation was created using voice recognition software. I have made every reasonable attempt to correct obvious errors, but I expect that there are errors of grammar and possibly content that I did not discover before finalizing the note.

## 2021-08-17 NOTE — ASSESSMENT & PLAN NOTE
He's been having significant allergies since July. Smoke aggravating his symptoms   Claritin- D in AM and every 2 hours with benadryl. At 7 at night, taking zyrtec. No nasal sprays- had issues with sinus infection in past.   Was going to allergist, but had severe reactions and no benefit.

## 2021-10-07 ENCOUNTER — TELEMEDICINE (OUTPATIENT)
Dept: MEDICAL GROUP | Facility: PHYSICIAN GROUP | Age: 41
End: 2021-10-07
Payer: COMMERCIAL

## 2021-10-07 VITALS — HEIGHT: 77 IN | BODY MASS INDEX: 27.75 KG/M2 | RESPIRATION RATE: 12 BRPM | WEIGHT: 235 LBS

## 2021-10-07 DIAGNOSIS — Z71.85 VACCINE COUNSELING: ICD-10-CM

## 2021-10-07 PROCEDURE — 99213 OFFICE O/P EST LOW 20 MIN: CPT | Mod: 95 | Performed by: PHYSICIAN ASSISTANT

## 2021-10-07 ASSESSMENT — FIBROSIS 4 INDEX: FIB4 SCORE: 0.67

## 2021-10-07 NOTE — ASSESSMENT & PLAN NOTE
Significant food and seasonal allergies. He has PCN allergy. He had major reaction, to anthrax vaccine- he got small pox vaccine the same time. All his lymph nodes were swollen, he felt very sick. He has underwent allergy shots in past and severe reaction.

## 2021-10-07 NOTE — PROGRESS NOTES
Virtual Visit: Established Patient   This visit was conducted via Zoom using secure and encrypted videoconferencing technology. The patient was in a private location in the state of [place].    The patient's identity was confirmed and verbal consent was obtained for this virtual visit.    Subjective:   CC:   Chief Complaint   Patient presents with   • Immunizations       Devin Carrasco is a 41 y.o. male presenting for evaluation and management of:    Vaccine counseling  Significant food and seasonal allergies. He has PCN allergy. He had major reaction, to anthrax vaccine- he got small pox vaccine the same time. All his lymph nodes were swollen, he felt very sick. He has underwent allergy shots in past and severe reaction.       ROS   Denies any recent fevers or chills. No nausea or vomiting. No chest pains or shortness of breath.     Allergies   Allergen Reactions   • Penicillins Hives, Swelling and Anaphylaxis       Current medicines (including changes today)  Current Outpatient Medications   Medication Sig Dispense Refill   • EPINEPHrine (EPIPEN) 0.3 MG/0.3ML Solution Auto-injector solution for injection USE FOR SEVERE ALLERGIC REACTION  0   • albuterol (VENTOLIN OR PROVENTIL) 108 (90 BASE) MCG/ACT AERS Inhale 2 Puffs by mouth every 6 hours as needed for Shortness of Breath. 8.5 g 3     No current facility-administered medications for this visit.       Patient Active Problem List    Diagnosis Date Noted   • Vaccine counseling 10/07/2021   • FH: colon cancer 08/20/2020   • Multiple food allergies 08/20/2020   • Vitamin D deficiency 08/18/2020   • Dyslipidemia 08/18/2020   • Wellness examination 06/10/2019   • Environmental and seasonal allergies 06/10/2019   • Tinnitus of both ears 01/11/2018   • Chronic headaches 01/11/2018   • Chronic low back pain 01/11/2018   • Chronic pain of left knee 01/11/2018   • Chronic left hip pain 01/11/2018   • Chronic pain of right knee 01/11/2018   • Chronic pain of right ankle  "01/11/2018   • Multiple nevi 01/11/2018   • Asbestos exposure 01/11/2018   • History of MRSA infection 01/11/2018   • Bilateral shoulder pain 07/02/2014       Family History   Problem Relation Age of Onset   • Hypertension Mother    • Cancer Father         colon   • Diabetes Father         pre-diabetes   • Heart Attack Maternal Uncle    • Heart Attack Maternal Grandfather    • Heart Attack Paternal Grandfather        He  has a past medical history of Allergy, Hip fracture (HCC), Right shoulder pain (7/2/2014), and Seasonal allergies (3/3/2014). He also has no past medical history of ASTHMA, Diabetes, or Hypertension.  He  has no past surgical history on file.       Objective:   Resp 12   Ht 1.956 m (6' 5\")   Wt 107 kg (235 lb)   BMI 27.87 kg/m²     Physical Exam:  Constitutional: Alert, no distress, well-groomed.  Skin: No rashes in visible areas.  Eye: Round. Conjunctiva clear, lids normal. No icterus.   ENMT: Lips pink without lesions, good dentition, moist mucous membranes. Phonation normal.  Neck: No masses, no thyromegaly. Moves freely without pain.  Respiratory: Unlabored respiratory effort, no cough or audible wheeze  Psych: Alert and oriented x3, normal affect and mood.       Assessment and Plan:   The following treatment plan was discussed:     1. Vaccine counseling    Discussed with patient today risks and benefits of COVID-19 vaccine.  Patient has had no previous anaphylactic reactions to vaccines.  He does have severe food and seasonal allergies though.  Did discuss the ingredients of the COVID-19 vaccines.  If he had any direct allergic reactions to these ingredients that he may have a complication with the vaccine.  The only adverse reactions from previous vaccine was when he had the smallpox and anthrax vaccine at the same time, he felt sick and had many swollen lymph nodes, no anaphylactic type reactions though.  After our discussion, patient will discuss with his employer further and may receive " the COVID-19 vaccine with his employer.    Follow-up: Return if symptoms worsen or fail to improve.        The patient verbalized agreement and understanding of current plan. All questions and concerns were addressed at time of visit.    Please note that this dictation was created using voice recognition software. I have made every reasonable attempt to correct obvious errors, but I expect that there are errors of grammar and possibly content that I did not discover before finalizing the note.

## 2022-04-01 ENCOUNTER — OFFICE VISIT (OUTPATIENT)
Dept: MEDICAL GROUP | Facility: PHYSICIAN GROUP | Age: 42
End: 2022-04-01
Payer: COMMERCIAL

## 2022-04-01 VITALS
WEIGHT: 261.2 LBS | HEART RATE: 78 BPM | DIASTOLIC BLOOD PRESSURE: 82 MMHG | SYSTOLIC BLOOD PRESSURE: 126 MMHG | OXYGEN SATURATION: 97 % | RESPIRATION RATE: 18 BRPM | TEMPERATURE: 98.2 F | BODY MASS INDEX: 30.84 KG/M2 | HEIGHT: 77 IN

## 2022-04-01 DIAGNOSIS — G89.29 CHRONIC NONINTRACTABLE HEADACHE, UNSPECIFIED HEADACHE TYPE: ICD-10-CM

## 2022-04-01 DIAGNOSIS — R51.9 CHRONIC NONINTRACTABLE HEADACHE, UNSPECIFIED HEADACHE TYPE: ICD-10-CM

## 2022-04-01 DIAGNOSIS — M25.552 CHRONIC LEFT HIP PAIN: ICD-10-CM

## 2022-04-01 DIAGNOSIS — E55.9 VITAMIN D DEFICIENCY: ICD-10-CM

## 2022-04-01 DIAGNOSIS — G89.29 CHRONIC LEFT HIP PAIN: ICD-10-CM

## 2022-04-01 DIAGNOSIS — Z80.0 FH: COLON CANCER: ICD-10-CM

## 2022-04-01 DIAGNOSIS — E78.5 DYSLIPIDEMIA: ICD-10-CM

## 2022-04-01 DIAGNOSIS — J45.20 MILD INTERMITTENT REACTIVE AIRWAY DISEASE WITHOUT COMPLICATION: ICD-10-CM

## 2022-04-01 PROBLEM — J45.909 REACTIVE AIRWAY DISEASE WITHOUT COMPLICATION: Status: ACTIVE | Noted: 2022-04-01

## 2022-04-01 PROCEDURE — 99213 OFFICE O/P EST LOW 20 MIN: CPT | Performed by: FAMILY MEDICINE

## 2022-04-01 RX ORDER — LORATADINE 10 MG/1
10 TABLET ORAL DAILY
COMMUNITY

## 2022-04-01 RX ORDER — SUMATRIPTAN 25 MG/1
25-100 TABLET, FILM COATED ORAL
COMMUNITY

## 2022-04-01 RX ORDER — VITAMIN B COMPLEX
1000 TABLET ORAL DAILY
COMMUNITY

## 2022-04-01 ASSESSMENT — PATIENT HEALTH QUESTIONNAIRE - PHQ9: CLINICAL INTERPRETATION OF PHQ2 SCORE: 0

## 2022-04-01 ASSESSMENT — FIBROSIS 4 INDEX: FIB4 SCORE: 0.67

## 2022-10-07 NOTE — PROGRESS NOTES
"Subjective:     CC: No chief complaint on file.      HPI:   Devin presents today to establish care.  [facility]  Patient states that he has seen allergist in the past and has been worked up.  Patient also has seen Ortho for his various hip shoulder and back issues.  Patient does get [facility]op[facility] last colonoscopy was done about 2 years ago was within normal limits.    Past Medical History:   Diagnosis Date   • Allergy    • Hip fracture (HCC)    • Right shoulder pain 7/2/2014   • Seasonal allergies 3/3/2014       Social History     Tobacco Use   • Smoking status: Never Smoker   • Smokeless tobacco: Never Used   Vaping Use   • Vaping Use: Never used   Substance Use Topics   • Alcohol use: No     Alcohol/week: 0.0 oz   • Drug use: No       Current Outpatient Medications Ordered in Epic   Medication Sig Dispense Refill   • SUMAtriptan (IMITREX) 25 MG Tab tablet Take  mg by mouth one time as needed for Migraine.     • EPINEPHrine (EPIPEN) 0.3 MG/0.3ML Solution Auto-injector solution for injection USE FOR SEVERE ALLERGIC REACTION  0   • albuterol (VENTOLIN OR PROVENTIL) 108 (90 BASE) MCG/ACT AERS Inhale 2 Puffs by mouth every 6 hours as needed for Shortness of Breath. 8.5 g 3     No current [facility]-ordered facility-administered medications on file.       Allergies:  Penicillins    Health Maintenance: Completed    ROS:  Gen: no fevers/chills, no changes in weight  Eyes: no changes in vision  ENT: no sore throat, no hearing loss, no bloody nose  Pulm: no sob, no cough  CV: no chest pain, no palpitations  GI: no nausea/vomiting, no diarrhea  : no dysuria  Neuro: no headaches, no numbness/tingling  Heme/Lymph: no easy bruising    Objective:     Exam:  /82   Pulse 78   Temp 36.8 °C (98.2 °F)   Resp 18   Ht 1.956 m (6' 5\")   Wt 118 kg (261 lb 3.2 oz)   SpO2 97%   BMI 30.97 kg/m²  Body mass index is " 30.97 kg/m².    Gen: Alert and oriented, No apparent distress.  Skin: Warm and dry.  No obvious lesions.  Eyes: Sclera wnl Pupils normal in size  ENT: Canals wnl and TM are not red  Neck: Neck is supple without lymphadenopathy.  Lungs: Normal effort, CTA bilaterally, no wheezes, rhonchi, or rales  CV: Regular rate and rhythm. No murmurs, rubs, or gallops.  ABD: Soft non-tender no organomegaly  Musculoskeletal: Normal gait. No extremity cyanosis, clubbing, or edema.  Neuro: Oriented to person, place and time  Psych: Mood is wnl       Assessment & Plan:     41 y.o. male with the following -     1. Chronic nonintractable headache, unspecified headache type  [facility] and states things are good going well this is a chronic problem    2. Chronic left hip pain  Patient states he has a labial tear is being followed up for this this is a chronic problem  3. Dyslipidemia  Patient is getting his [facility] work [facility] patient will follow up with me with his lab results this is a chronic problem    4. Vitamin D deficiency  Patient is taking vitamin D I assume he will get lab work done for this and will follow up with me this is a chronic problem    5. Mild intermittent reactive airway disease without complication  Patient is getting [facility] this is a chronic problem    6. FH: colon cancer  Patient is getting colonoscopies done every 5 years there is been no abnormalities noted    Other orders  - SUMAtriptan (IMITREX) 25 MG Tab tablet; Take  mg by mouth one time as needed for Migraine.       Return in about 3 weeks (around 4/22/2022).    Please note that this dictation was created using voice recognition software. I have made every reasonable attempt to correct obvious errors, but I expect that there are errors of grammar and possibly content that I did not discover before finalizing the note.   Opt out

## 2022-11-29 ENCOUNTER — TELEMEDICINE (OUTPATIENT)
Dept: MEDICAL GROUP | Facility: PHYSICIAN GROUP | Age: 42
End: 2022-11-29
Payer: COMMERCIAL

## 2022-11-29 VITALS — BODY MASS INDEX: 29.52 KG/M2 | WEIGHT: 250 LBS | HEIGHT: 77 IN | RESPIRATION RATE: 16 BRPM

## 2022-11-29 DIAGNOSIS — J01.80 ACUTE NON-RECURRENT SINUSITIS OF OTHER SINUS: ICD-10-CM

## 2022-11-29 PROCEDURE — 99213 OFFICE O/P EST LOW 20 MIN: CPT | Mod: 95 | Performed by: FAMILY MEDICINE

## 2022-11-29 RX ORDER — AZITHROMYCIN 250 MG/1
TABLET, FILM COATED ORAL
Qty: 6 TABLET | Refills: 0 | Status: SHIPPED | OUTPATIENT
Start: 2022-11-29 | End: 2022-12-03

## 2022-11-29 RX ORDER — PSEUDOEPHEDRINE HYDROCHLORIDE 60 MG/1
TABLET, FILM COATED ORAL
COMMUNITY
Start: 2022-06-29

## 2022-11-29 RX ORDER — CALCIUM POLYCARBOPHIL 625 MG
TABLET ORAL
COMMUNITY
Start: 2022-05-29

## 2022-11-29 RX ORDER — ATORVASTATIN CALCIUM 10 MG/1
TABLET, FILM COATED ORAL
COMMUNITY
Start: 2022-10-26

## 2022-11-29 NOTE — PROGRESS NOTES
Virtual Visit: Established Patient   This visit was conducted via Zoom using secure and encrypted videoconferencing technology.   The patient was in their home in the state of [place].    The patient's identity was confirmed and verbal consent was obtained for this virtual visit.     Subjective:   CC:   Chief Complaint   Patient presents with    Nasal Congestion     Complains of chest congestion     Otalgia    Pharyngitis     X 3 days       Devin Carrasco is a 42 y.o. male complaining of of nasal congestion and ear pressure specially on the right ear.  Patient did make a virtual appointment due to the [facility] who told him not to come in because he could have COVID.  Patient has not tested himself as of yet.  Patient is blowing yellow to green material from his nose.  Patient is not running a fever denies any loss of taste or smell.    ROS     Current medicines (including changes today)  Current Outpatient Medications   Medication Sig Dispense Refill    pseudoephedrine (SUDAFED) 60 MG Tab       Calcium Polycarbophil (FIBER) 625 MG Tab       atorvastatin (LIPITOR) 10 MG Tab       azithromycin (ZITHROMAX) 250 MG Tab 2 tablets today 1 each day for the next 4 more days 6 Tablet 0    SUMAtriptan (IMITREX) 25 MG Tab tablet Take  mg by mouth one time as needed for Migraine.      vitamin D3 (CHOLECALCIFEROL) 1000 Unit (25 mcg) Tab Take 1,000 Units by mouth every day.      loratadine (CLARITIN) 10 MG Tab Take 10 mg by mouth every day.      EPINEPHrine (EPIPEN) 0.3 MG/0.3ML Solution Auto-injector solution for injection USE FOR SEVERE ALLERGIC REACTION  0    albuterol (VENTOLIN OR PROVENTIL) 108 (90 BASE) MCG/ACT AERS Inhale 2 Puffs by mouth every 6 hours as needed for Shortness of Breath. 8.5 g 3     No current facility-administered medications for this visit.       Patient Active Problem List    Diagnosis Date Noted    Other acute sinusitis 11/29/2022    Reactive airway disease without  "complication 04/01/2022    Vaccine counseling 10/07/2021    FH: colon cancer 08/20/2020    Multiple food allergies 08/20/2020    Vitamin D deficiency 08/18/2020    Dyslipidemia 08/18/2020    Wellness examination 06/10/2019    Environmental and seasonal allergies 06/10/2019    Tinnitus of both ears 01/11/2018    Chronic headaches 01/11/2018    Chronic low back pain 01/11/2018    Chronic pain of left knee 01/11/2018    Chronic left hip pain 01/11/2018    Chronic pain of right knee 01/11/2018    Chronic pain of right ankle 01/11/2018    Multiple nevi 01/11/2018    Asbestos exposure 01/11/2018    History of MRSA infection 01/11/2018    Bilateral shoulder pain 07/02/2014        Objective:   Resp 16   Ht 1.956 m (6' 5\")   Wt 113 kg (250 lb) Comment: pt provided  BMI 29.65 kg/m²     Physical Exam:  Constitutional: Alert, no distress, well-groomed.  Skin: No rashes in visible areas.  Eye: Round. Conjunctiva clear, lids normal.   ENMT: Lips pink without lesions. Phonation normal.  Neck:  Moves freely without pain.  Respiratory: Unlabored respiratory effort, no cough or audible wheeze  Psych: Alert and oriented x3, normal affect and mood.     Assessment and Plan:   The following treatment plan was discussed:     1. Acute non-recurrent sinusitis of other sinus  I did advise patient he can always do a home COVID test.  But by the sounds of his symptoms I do not think it is COVID.  It sounds more like he has a sinus infection we will go ahead and treat him with Zithromax.  I recommend he start using Flonase nasal spray 2 sprays into each nostril once a day I did inform patient takes 1 to 2 weeks to kick in.  I also encourage patient if he continues to feel bad to make an appointment in person so I can examine him.  This is acute problem      - azithromycin (ZITHROMAX) 250 MG Tab; 2 tablets today 1 each day for the next 4 more days  Dispense: 6 Tablet; Refill: 0    Follow-up: Return if symptoms worsen or fail to improve.     "

## 2022-12-20 ENCOUNTER — OFFICE VISIT (OUTPATIENT)
Dept: MEDICAL GROUP | Facility: PHYSICIAN GROUP | Age: 42
End: 2022-12-20
Payer: COMMERCIAL

## 2022-12-20 VITALS
SYSTOLIC BLOOD PRESSURE: 118 MMHG | OXYGEN SATURATION: 96 % | HEIGHT: 77 IN | HEART RATE: 67 BPM | RESPIRATION RATE: 20 BRPM | BODY MASS INDEX: 30.46 KG/M2 | DIASTOLIC BLOOD PRESSURE: 72 MMHG | TEMPERATURE: 98.7 F | WEIGHT: 258 LBS

## 2022-12-20 DIAGNOSIS — E78.5 DYSLIPIDEMIA: ICD-10-CM

## 2022-12-20 DIAGNOSIS — E55.9 VITAMIN D DEFICIENCY: ICD-10-CM

## 2022-12-20 PROCEDURE — 99214 OFFICE O/P EST MOD 30 MIN: CPT | Performed by: NURSE PRACTITIONER

## 2022-12-20 RX ORDER — CLOTRIMAZOLE 1 %
1 CREAM (GRAM) TOPICAL 2 TIMES DAILY
COMMUNITY

## 2022-12-20 RX ORDER — TRIAMCINOLONE ACETONIDE 1 MG/G
CREAM TOPICAL 2 TIMES DAILY
COMMUNITY

## 2022-12-20 RX ORDER — DIPHENHYDRAMINE HCL 25 MG
25 TABLET ORAL EVERY 6 HOURS PRN
COMMUNITY

## 2022-12-21 NOTE — ASSESSMENT & PLAN NOTE
"Chronic, ongoing.  Currently taking Atorvastatin 10 mg as directed.  Denies side effects of the medication--no myalgias or abdominal pain.  10-year cardiac risk ASCVD score: 2.9%  Reports diet is \"okay\".   He is following a low-cholesterol diet.  He is exercising regularly.  He is not taking ASA daily.  He denies dizziness, claudication, or chest pain.  Due for updated lab work. [facility]     06/11/19 06:35 08/26/20 00:00   Cholesterol,Tot 239 (H) 233 (H)   Triglycerides 294 (H) 147   HDL 42 39 !    (H) 165 (H)     "

## 2022-12-21 NOTE — PROGRESS NOTES
"Subjective  Chief Complaint  Establish care to manage his chronic conditions    History of Present Illness  Devin Carrasco is a 42 y.o. male. This patient is here today to establish care.  His prior PCP was Dr. Nancy Paiz.    Dyslipidemia  Chronic, ongoing.  Currently taking Atorvastatin 10 mg as directed.  Denies side effects of the medication--no myalgias or abdominal pain.  10-year cardiac risk ASCVD score: 2.9%  Reports diet is \"okay\".   He is following a low-cholesterol diet.  He is exercising regularly.  He is not taking ASA daily.  He denies dizziness, claudication, or chest pain.  Due for updated lab work. [facility]     06/11/19 06:35 08/26/20 00:00   Cholesterol,Tot 239 (H) 233 (H)   Triglycerides 294 (H) 147   HDL 42 39 !    (H) 165 (H)       Vitamin D deficiency  Chronic, ongoing.  Denies fatigue.  Denies any muscle weakness.  No history of CKD.  Reports having a good diet, including dairy products.  No history of IBD's, celiac, CF, or surgeries causing malabsorption.  Patient is not obese.  Is taking vitamin d and calcium supplement.      02/27/17 11:03 06/11/19 06:35 08/26/20 00:00   25-Hydroxy   Vitamin D 25 15 (L) 13 (L) 18 (L)     Past Medical History    Allergies: Penicillins  Past Medical History:   Diagnosis Date    Allergy     Hip fracture (HCC)     Right shoulder pain 7/2/2014    Seasonal allergies 3/3/2014     History reviewed. No pertinent surgical history.  Current Outpatient Medications Ordered in Epic   Medication Sig Dispense Refill    clotrimazole (LOTRIMIN) 1 % Cream Apply 1 Application topically 2 times a day.      triamcinolone acetonide (KENALOG) 0.1 % Cream Apply  topically 2 times a day.      diphenhydrAMINE (BENADRYL) 25 MG Tab Take 25 mg by mouth every 6 hours as needed for Sleep.      pseudoephedrine (SUDAFED) 60 MG Tab       Calcium Polycarbophil (FIBER) 625 MG Tab       atorvastatin (LIPITOR) 10 MG Tab       SUMAtriptan (IMITREX) 25 MG Tab tablet " "Take  mg by mouth one time as needed for Migraine.      vitamin D3 (CHOLECALCIFEROL) 1000 Unit (25 mcg) Tab Take 1,000 Units by mouth every day.      loratadine (CLARITIN) 10 MG Tab Take 10 mg by mouth every day.      albuterol (VENTOLIN OR PROVENTIL) 108 (90 BASE) MCG/ACT AERS Inhale 2 Puffs by mouth every 6 hours as needed for Shortness of Breath. 8.5 g 3    EPINEPHrine (EPIPEN) 0.3 MG/0.3ML Solution Auto-injector solution for injection USE FOR SEVERE ALLERGIC REACTION  0     No current [facility]-ordered facility-administered medications on file.     Family History:    Family History   Problem Relation Age of Onset    Hypertension Mother     Cancer Father         colon    Diabetes Father         pre-diabetes    Heart Attack Maternal Uncle     Heart Attack Maternal Grandfather     Heart Attack Paternal Grandfather     Hypertension Maternal Grandmother     Dementia Maternal Grandmother     Osteoporosis Maternal Grandmother     Cancer Paternal Grandmother       Personal/Social History:    Social History     Tobacco Use    Smoking status: Never    Smokeless tobacco: Never   Vaping Use    Vaping Use: Never used   Substance Use Topics    Alcohol use: No     Alcohol/week: 0.0 oz    Drug use: No     Social History     Social History Narrative    Not on file      Review of Systems:     General: Negative for fever/chills and unexpected weight change.    Respiratory:  Negative for cough and dyspnea.     Cardiovascular:  Negative for chest pain and palpitations.   Musculoskeletal:  Negative for myalgias.    Skin:  Negative for rash.     Objective  Physical Exam:   /72 (BP Location: Right arm, Patient Position: Sitting, BP Cuff Size: Adult)   Pulse 67   Temp 37.1 °C (98.7 °F) (Temporal)   Resp 20   Ht 1.956 m (6' 5\")   Wt 117 kg (258 lb)   SpO2 96%  Body mass index is 30.59 kg/m².  General:  Alert and oriented.  Well appearing.  NAD.  Head:  Normocephalic.   Neck: Supple without JVD. No " lymphadenopathy.  Pulmonary:  Normal effort.  Clear to ausculation without rales, ronchi, or wheezing.  Cardiovascular:  Regular rate and rhythm without murmur, rubs or gallop.  Radial pulses are intact and equal bilaterally.  Musculoskeletal:  No extremity cyanosis, clubbing, or edema.  Skin:  Warm and dry.  No obvious lesions.  Psych: Normal mood and affect. Alert and oriented x3. Judgment and insight is normal.      Assessment/Plan   1. Dyslipidemia  Chronic and ongoing.  Continue to take Atorvastatin 10 mg daily.  Educated on a healthy diet and exercise.  Due for updated labs, [facility]    2. Vitamin D deficiency  Chronic and ongoing.  Continue to take Vitamin D or Calcium supplement.  Due for updated labs, [facility].      Health Maintenance: Completed    Return if symptoms worsen or fail to improve.    Please note that this dictation was created using voice recognition software. I have made every reasonable attempt to correct obvious errors, but I expect that there are errors of grammar and possibly content that I did not discover before finalizing the note.    NAM Jacob  [facility]

## 2022-12-21 NOTE — ASSESSMENT & PLAN NOTE
Chronic, ongoing.  Denies fatigue.  Denies any muscle weakness.  No history of CKD.  Reports having a good diet, including dairy products.  No history of IBD's, celiac, CF, or surgeries causing malabsorption.  Patient is not obese.  Is taking vitamin d and calcium supplement.      02/27/17 11:03 06/11/19 06:35 08/26/20 00:00   25-Hydroxy   Vitamin D 25 15 (L) 13 (L) 18 (L)

## 2023-01-12 NOTE — PROGRESS NOTES
Devin Carrasco is a 38 y.o. male here for a non-provider visit for Kenalog injection.    Reason for injection: Rhinitis  Order in MAR?: Yes  Patient supplied?:No  Minimum interval has been met for this injection (per MAR order): Yes    Order and dose verified by: TG  Patient tolerated injection and no adverse effects were observed or reported: Yes    # of Administrations remaining in MAR: 0    
bed to chair/chair to bed/10 feet

## 2023-05-23 ENCOUNTER — OFFICE VISIT (OUTPATIENT)
Dept: MEDICAL GROUP | Facility: PHYSICIAN GROUP | Age: 43
End: 2023-05-23
Payer: COMMERCIAL

## 2023-05-23 VITALS
TEMPERATURE: 98.4 F | HEIGHT: 77 IN | OXYGEN SATURATION: 96 % | DIASTOLIC BLOOD PRESSURE: 84 MMHG | HEART RATE: 81 BPM | SYSTOLIC BLOOD PRESSURE: 126 MMHG | BODY MASS INDEX: 31.41 KG/M2 | WEIGHT: 266 LBS

## 2023-05-23 DIAGNOSIS — R51.9 CHRONIC NONINTRACTABLE HEADACHE, UNSPECIFIED HEADACHE TYPE: ICD-10-CM

## 2023-05-23 DIAGNOSIS — E78.5 DYSLIPIDEMIA: ICD-10-CM

## 2023-05-23 DIAGNOSIS — J30.89 ENVIRONMENTAL AND SEASONAL ALLERGIES: ICD-10-CM

## 2023-05-23 DIAGNOSIS — E66.9 OBESITY (BMI 30-39.9): ICD-10-CM

## 2023-05-23 DIAGNOSIS — Z12.5 SCREENING FOR MALIGNANT NEOPLASM OF PROSTATE: ICD-10-CM

## 2023-05-23 DIAGNOSIS — Z11.59 NEED FOR HEPATITIS C SCREENING TEST: ICD-10-CM

## 2023-05-23 DIAGNOSIS — Z00.00 ROUTINE HEALTH MAINTENANCE: ICD-10-CM

## 2023-05-23 DIAGNOSIS — Z80.0 FH: COLON CANCER: ICD-10-CM

## 2023-05-23 DIAGNOSIS — Z76.89 ESTABLISHING CARE WITH NEW DOCTOR, ENCOUNTER FOR: ICD-10-CM

## 2023-05-23 DIAGNOSIS — J45.20 MILD INTERMITTENT REACTIVE AIRWAY DISEASE WITHOUT COMPLICATION: ICD-10-CM

## 2023-05-23 DIAGNOSIS — Z91.018 MULTIPLE FOOD ALLERGIES: ICD-10-CM

## 2023-05-23 DIAGNOSIS — G89.29 CHRONIC NONINTRACTABLE HEADACHE, UNSPECIFIED HEADACHE TYPE: ICD-10-CM

## 2023-05-23 DIAGNOSIS — E55.9 VITAMIN D DEFICIENCY: ICD-10-CM

## 2023-05-23 DIAGNOSIS — T07.XXXA MULTIPLE FRACTURES: ICD-10-CM

## 2023-05-23 PROBLEM — Z71.85 VACCINE COUNSELING: Status: RESOLVED | Noted: 2021-10-07 | Resolved: 2023-05-23

## 2023-05-23 PROBLEM — J01.80 OTHER ACUTE SINUSITIS: Status: RESOLVED | Noted: 2022-11-29 | Resolved: 2023-05-23

## 2023-05-23 PROCEDURE — 3079F DIAST BP 80-89 MM HG: CPT | Performed by: NURSE PRACTITIONER

## 2023-05-23 PROCEDURE — 3074F SYST BP LT 130 MM HG: CPT | Performed by: NURSE PRACTITIONER

## 2023-05-23 PROCEDURE — 99214 OFFICE O/P EST MOD 30 MIN: CPT | Performed by: NURSE PRACTITIONER

## 2023-05-23 ASSESSMENT — PATIENT HEALTH QUESTIONNAIRE - PHQ9: CLINICAL INTERPRETATION OF PHQ2 SCORE: 0

## 2023-05-23 NOTE — ASSESSMENT & PLAN NOTE
New to examiner, chronic for patient. [facility] Reports that he does not have a response to the albuterol when completing PFT's. States that [facility] providers are not sure if [place]. Continues albuterol as needed when allergies are bad.

## 2023-05-23 NOTE — ASSESSMENT & PLAN NOTE
New to examiner, chronic for patient. Continues atorvastatin 10 mg/day for the last year, denies side effects. Due for update labs.

## 2023-05-23 NOTE — ASSESSMENT & PLAN NOTE
New to examiner, chronic for patient.  Continues diphenhydramine as needed, loratadine 10 mg daily, Sudafed as needed.  Was following with allergy specialist, reports that even the lowest dose of allergy injections were causing issues.

## 2023-05-23 NOTE — ASSESSMENT & PLAN NOTE
New to examiner, chronic for patient. Tries to have a healthy diet, does not exercise. Due for labs.

## 2023-05-23 NOTE — ASSESSMENT & PLAN NOTE
New to examiner, chronic for patient.  Was previously following with allergy specialist and receiving allergy shots, discontinued after lowest dose were causing problems.  Reports food allergies to lamb, turkey, rosemary, thyme, kiwi, melons, bananas.  Is prescribed EpiPen as needed, carries Benadryl at all times.

## 2023-05-23 NOTE — ASSESSMENT & PLAN NOTE
Patient reports completing colonscopy a couple of years ago with a 5 year recall. Follows up with GI Specialist on 7/17/23.

## 2023-05-23 NOTE — PROGRESS NOTES
CC:   Chief Complaint   Patient presents with    Establish Care     HISTORY OF THE PRESENT ILLNESS: Patient is a 42 y.o. male. This pleasant patient is here today to establish care and discuss multiple issues as listed below.     Health Maintenance: Completed    Multiple food allergies  New to examiner, chronic for patient.  Was previously following with allergy specialist and receiving allergy shots, discontinued after lowest dose were causing problems.  Reports food allergies to lamb, turkey, rosemary, thyme, kiwi, melons, bananas.  Is prescribed EpiPen as needed, carries Benadryl at all times.    Chronic headaches  New to examiner, chronic for patient.  The patient is prescribed Imitrex, but does not take it because he does not feel that it works well. Thinks that he waits too long to take the medication.  Reports that what has always worked for the patient is Excedrin migraine with coke, it does not resolve the headache or migraine but decreases of the sharpness. Reports having headaches daily, migraines 1-2 times a week. Reports flashing lights aura.     Dyslipidemia  New to examiner, chronic for patient. Continues atorvastatin 10 mg/day for the last year, denies side effects. Due for update labs.    Reactive airway disease without complication  New to examiner, chronic for patient. [facility] Reports that he does not have a response to the albuterol when completing PFT's. States that [facility] providers are not sure if [place]. Continues albuterol as needed when allergies are bad.    Vitamin D deficiency  New to examiner, chronic for patient. Continues vitamin d daily. Due for updated labs.    Obesity (BMI 30-39.9)  New to examiner, chronic for patient. Tries to have a healthy diet, does not exercise. Due for labs.    FH: colon cancer  Patient reports completing colonscopy a couple of years ago with a 5 year recall. Follows up with GI Specialist on 7/17/23.    Environmental and seasonal  allergies  New to examiner, chronic for patient.  Continues diphenhydramine as needed, loratadine 10 mg daily, Sudafed as needed.  Was following with allergy specialist, reports that even the lowest dose of allergy injections were causing issues.    Allergies: Penicillins  Current Outpatient Medications Ordered in Epic   Medication Sig Dispense Refill    clotrimazole (LOTRIMIN) 1 % Cream Apply 1 Application topically 2 times a day.      triamcinolone acetonide (KENALOG) 0.1 % Cream Apply  topically 2 times a day.      diphenhydrAMINE (BENADRYL) 25 MG Tab Take 25 mg by mouth every 6 hours as needed for Sleep.      pseudoephedrine (SUDAFED) 60 MG Tab       Calcium Polycarbophil (FIBER) 625 MG Tab       atorvastatin (LIPITOR) 10 MG Tab       SUMAtriptan (IMITREX) 25 MG Tab tablet Take  mg by mouth one time as needed for Migraine.      vitamin D3 (CHOLECALCIFEROL) 1000 Unit (25 mcg) Tab Take 1,000 Units by mouth every day.      loratadine (CLARITIN) 10 MG Tab Take 10 mg by mouth every day.      EPINEPHrine (EPIPEN) 0.3 MG/0.3ML Solution Auto-injector solution for injection USE FOR SEVERE ALLERGIC REACTION  0    albuterol (VENTOLIN OR PROVENTIL) 108 (90 BASE) MCG/ACT AERS Inhale 2 Puffs by mouth every 6 hours as needed for Shortness of Breath. 8.5 g 3     No current [facility]-ordered facility-administered medications on file.     Past Medical History:   Diagnosis Date    Allergy     Broken neck (HCC)     Hip fracture (HCC) 2004    Right shoulder pain 07/02/2014    Seasonal allergies 03/03/2014     History reviewed. No pertinent surgical history.  Social History     Tobacco Use    Smoking status: Never     Passive exposure: Past    Smokeless tobacco: Never   Vaping Use    Vaping Use: Never used   Substance Use Topics    Alcohol use: No     Alcohol/week: 0.0 oz    Drug use: No     Social History     Social History Narrative    Not on file     Family History   Problem Relation Age of Onset    Breast Cancer Mother      "Cancer Mother     Hypertension Mother     Cancer Father         colon    Diabetes Father         pre-diabetes    Colon Cancer Father     Prostate cancer Father     No Known Problems Brother     No Known Problems Brother     Heart Attack Maternal Uncle     Obesity Maternal Uncle     No Known Problems Maternal Uncle     Hypertension Maternal Grandmother     Dementia Maternal Grandmother     Osteoporosis Maternal Grandmother     Alzheimer's Disease Maternal Grandmother     Heart Attack Maternal Grandfather     Cancer Paternal Grandmother     Pancreatic Cancer Paternal Grandmother     Heart Attack Paternal Grandfather      ROS:   Constitutional: No fevers, chills, malaise/fatigue.  Eyes: No eye pain.  ENT: No sore throat, congestion.   Resp: No cough, shortness of breath.  CV: No chest pain, leg swelling, palpitations.  GI: No nausea/vomiting, abdominal pain, constipation, diarrhea.  : No dysuria, hematuria.  MSK: No weakness.  Skin: No rashes.  Neuro: + Chronic headaches/migraines.  No dizziness, weakness.  Psych: No suicidal ideations.    All remaining systems reviewed and found to be negative, except as stated above.        Exam: /84 (BP Location: Left arm, Patient Position: Sitting, BP Cuff Size: Large adult)   Pulse 81   Temp 36.9 °C (98.4 °F) (Temporal)   Ht 1.956 m (6' 5\")   Wt 121 kg (266 lb)   SpO2 96%  Body mass index is 31.54 kg/m².    General: Well nourished, well developed male in NAD, awake and conversant.  Eyes: Normal conjunctiva, anicteric.  Round symmetrical pupils.  ENT: Hearing grossly intact.  No nasal discharge.  Neck: Neck is supple.  No masses or thyromegaly.  CV: No lower extremity edema.  Respiratory: Respirations are nonlabored.  No wheezing.  Abdomen: Non-Distended.  Skin: Warm.  No rashes or ulcers.  MSK: Normal ambulation.  No clubbing or cyanosis.  Neuro: Sensation and CN II-XII grossly normal.  Psych: Alert and oriented.  Cooperative, appropriate mood and affect, normal " judgment.      Assessment/Plan:  1. Establishing care with new doctor, encounter for    2. Dyslipidemia  New to examiner, chronic for patient.  Continue atorvastatin 10 mg daily, does not need refill at this time.  Due for updated annual labs prior to follow-up.  - Comp Metabolic Panel; Future  - Lipid Profile; Future  - TSH WITH REFLEX TO FT4; Future    3. Obesity (BMI 30-39.9)  New to examiner, chronic for patient. Due for updated annual labs prior to follow-up.  - CBC WITH DIFFERENTIAL; Future  - Comp Metabolic Panel; Future  - Lipid Profile; Future  - TSH WITH REFLEX TO FT4; Future    4. Mild intermittent reactive airway disease without complication  New to examiner, chronic for patient.  Continue albuterol inhaler as needed, does not need a refill at this time. Due for updated annual labs prior to follow-up.  - CBC WITH DIFFERENTIAL; Future  - TSH WITH REFLEX TO FT4; Future    5. Environmental and seasonal allergies  New to examiner, chronic for patient.  Continue over-the-counter diphenhydramine, loratadine, Sudafed as needed.    6. Vitamin D deficiency  New to examiner, chronic for patient. Continues over-the-counter vitamin D daily. Due for updated annual labs prior to follow-up.  - VITAMIN D,25 HYDROXY (DEFICIENCY); Future    7. Multiple food allergies  New to examiner, chronic for patient.  No longer following with allergy specialist.  Keep EpiPen and Benadryl available at all times.  Does not need refills.    8. Multiple fractures  New to examiner, chronic for patient.  Patient has had >10 bone fractures, states that he did not know his left hip was fractured or his neck was fractured for years after.  Plan for patient to complete bone density testing to screen for osteopenia or osteoporosis.  - DS-BONE DENSITY STUDY (DEXA); Future    9. Chronic nonintractable headache, unspecified headache type  New to examiner, chronic for patient.  Recommend taking sumatriptan at onset of migraine/aura.  Continue  over-the-counter Excedrin Migraine as needed.    10. FH: colon cancer  New to examiner.  [facility], follow-up appointment scheduled in July 2023.  Patient reports completing colonoscopy a few years ago.    11. Screening for malignant neoplasm of prostate  Due for screening.  - PROSTATE SPECIFIC AG SCREENING; Future    12. Routine health maintenance  Due for updated annual labs prior to follow-up.  - CBC WITH DIFFERENTIAL; Future  - Comp Metabolic Panel; Future  - HEP C VIRUS ANTIBODY; Future  - Lipid Profile; Future  - TSH WITH REFLEX TO FT4; Future  - PROSTATE SPECIFIC AG SCREENING; Future  - VITAMIN D,25 HYDROXY (DEFICIENCY); Future  - Patient identified as having weight management issue.  Appropriate orders and counseling given.    13. Need for hepatitis C screening test  Due for one-time screening.  - HEP C VIRUS ANTIBODY; Future     Educated in proper administration of medication(s) ordered today including safety, possible SE, risks, benefits, rationale and alternatives to therapy.   Supportive care, differential diagnoses, and indications for immediate follow-up discussed with patient.    Pathogenesis of diagnosis discussed including typical length and natural progression.    Instructed to return to clinic or nearest emergency department for any change in condition, further concerns, or worsening of symptoms.  Patient states understanding of the plan of care and discharge instructions.    Return in about 3 weeks (around 6/13/2023) for Preventative Annual, Follow up Labs.    Please note that this dictation was created using voice recognition software. I have made every reasonable attempt to correct obvious errors, but I expect that there are errors of grammar and possibly content that I did not discover before finalizing the note.

## 2023-05-23 NOTE — ASSESSMENT & PLAN NOTE
New to examiner, chronic for patient.  The patient is prescribed Imitrex, but does not take it because he does not feel that it works well. Thinks that he waits too long to take the medication.  Reports that what has always worked for the patient is Excedrin migraine with coke, it does not resolve the headache or migraine but decreases of the sharpness. Reports having headaches daily, migraines 1-2 times a week. Reports flashing lights aura.

## 2023-07-13 ENCOUNTER — HOSPITAL ENCOUNTER (OUTPATIENT)
Dept: RADIOLOGY | Facility: MEDICAL CENTER | Age: 43
End: 2023-07-13
Attending: NURSE PRACTITIONER
Payer: COMMERCIAL

## 2023-07-13 DIAGNOSIS — T07.XXXA MULTIPLE FRACTURES: ICD-10-CM

## 2023-07-13 PROCEDURE — 77080 DXA BONE DENSITY AXIAL: CPT

## 2023-12-27 NOTE — MR AVS SNAPSHOT
"        Devin Carrasco   2017 10:00 AM   Office Visit   MRN: [id number]    Department[place]  [facility] [place]   Dept Phone:  [phone]    Description:  Male : 1980   Provider:  SUZANNA Timmons           Reason for Visit     Follow-Up GI      Allergies as of 2017     Allergen Noted Reactions    Pcn [Penicillins] 2013   Hives, Swelling      You were diagnosed with     Diverticulitis of large intestine without perforation or abscess without bleeding   [441964]  -  Primary     Epigastric abdominal tenderness without rebound tenderness   [7113556]       Mixed hyperlipidemia   [272.2.ICD-9-CM]       Vitamin D insufficiency   [684652]         Vital Signs     Blood Pressure Pulse Temperature Respirations Height Weight    122/92 mmHg 77 36.7 °C (98.1 °F) 18 1.956 m (6' 5.01\") 112.129 kg (247 lb 3.2 oz)    Body Mass Index Oxygen Saturation Smoking Status             29.31 kg/m2 96% Never Smoker          Basic Information     Date Of Birth Sex Race Ethnicity Preferred Language    1980 Male Unknown Unknown English      Problem List              ICD-10-CM Priority Class Noted - Resolved    Seasonal allergies J30.2   3/3/2014 - Present    Right shoulder pain M25.511   2014 - Present    Sore throat J02.9   2016 - Present      Health Maintenance        Date Due Completion Dates    IMM INFLUENZA (1) 2016 ---    IMM DTaP/Tdap/Td Vaccine (2 - Td) 2025            Current Immunizations     Tdap Vaccine 2015      Below and/or attached are the medications your provider expects you to take. Review all of your home medications and newly ordered medications with your provider and/or pharmacist. Follow medication instructions as directed by your provider and/or pharmacist. Please keep your medication list with you and share with your provider. Update the information when medications are discontinued, doses are changed, or new medications (including over-the-counter products) " are added; and carry medication information at all times in the event of emergency situations     Allergies:  PCN - Hives,Swelling               Medications  Valid as of: February 27, 2017 -  1:02 PM    Generic Name Brand Name Tablet Size Instructions for use    Albuterol Sulfate (Aero Soln) albuterol 108 (90 BASE) MCG/ACT Inhale 2 Puffs by mouth every 6 hours as needed for Shortness of Breath.        Ciprofloxacin HCl (Tab) CIPRO 500 MG Take 1 Tab by mouth 2 times a day for 10 days.        Ergocalciferol (Cap) DRISDOL 73850 UNIT Take 1 cap by mouth every day for 10 days, and then take 1 cap by mouth every Tuesday and Saturday.        Loratadine-Pseudoephedrine (TABLET SR 24 HR) CLARITIN-D 24-hour  MG Take 1 Tab by mouth every day.        MetroNIDAZOLE (Tab) FLAGYL 500 MG Take 1 Tab by mouth 2 Times a Day for 10 days.        .                 Medicines prescribed today were sent to:     [facility] [employer] [address] [place] [address] [facility] [address] [place], [place] [address]ID WAY AT [facility]. & [place]    [address] [facility] [address]    Phone: [phone] Fax: [phone]    Open 24 Hours?: No      Medication refill instructions:       If your prescription bottle indicates you have medication refills left, it is not necessary to call your provider’s office. Please contact your pharmacy and they will refill your medication.    If your prescription bottle indicates you do not have any refills left, you may request refills at any time through one of the following ways: The online [employer] system (except Urgent Care), by calling your provider’s office, or by asking your pharmacy to contact your provider’s office with a refill request. Medication refills are processed only during regular business hours and may not be available until the next business day. Your provider may request additional information or to have a follow-up visit with you prior to refilling your medication.   *Please Note: Medication refills are assigned a  new Rx number when refilled electronically. Your pharmacy may indicate that no refills were authorized even though a new prescription for the same medication is available at the pharmacy. Please request the medicine by name with the pharmacy before contacting your provider for a refill.        Your To Do List     Future Labs/Procedures Complete By Expires    LIPID PROFILE  As directed 2/27/2018    VITAMIN D,25 HYDROXY  As directed 2/27/2018      Referral     A referral request has been sent to our patient care coordination department. Please allow 3-5 business days for us to process this request and contact you either by phone or mail. If you do not hear from us by the 5th business day, please call us at [phone].           [employer] Access Code: Activation code not generated  Current [employer] Status: Active           no

## 2024-02-28 ENCOUNTER — OFFICE VISIT (OUTPATIENT)
Dept: URGENT CARE | Facility: PHYSICIAN GROUP | Age: 44
End: 2024-02-28
Payer: COMMERCIAL

## 2024-02-28 VITALS
HEIGHT: 77 IN | RESPIRATION RATE: 16 BRPM | HEART RATE: 94 BPM | SYSTOLIC BLOOD PRESSURE: 122 MMHG | WEIGHT: 268.41 LBS | TEMPERATURE: 102 F | DIASTOLIC BLOOD PRESSURE: 86 MMHG | BODY MASS INDEX: 31.69 KG/M2 | OXYGEN SATURATION: 94 %

## 2024-02-28 DIAGNOSIS — J11.1 INFLUENZA: ICD-10-CM

## 2024-02-28 LAB
FLUAV RNA SPEC QL NAA+PROBE: POSITIVE
FLUBV RNA SPEC QL NAA+PROBE: NEGATIVE
RSV RNA SPEC QL NAA+PROBE: NEGATIVE
SARS-COV-2 RNA RESP QL NAA+PROBE: NEGATIVE

## 2024-02-28 PROCEDURE — 3079F DIAST BP 80-89 MM HG: CPT | Performed by: FAMILY MEDICINE

## 2024-02-28 PROCEDURE — 0241U POCT CEPHEID COV-2, FLU A/B, RSV - PCR: CPT | Performed by: FAMILY MEDICINE

## 2024-02-28 PROCEDURE — 3074F SYST BP LT 130 MM HG: CPT | Performed by: FAMILY MEDICINE

## 2024-02-28 PROCEDURE — 99213 OFFICE O/P EST LOW 20 MIN: CPT | Performed by: FAMILY MEDICINE

## 2024-02-28 RX ORDER — OSELTAMIVIR PHOSPHATE 75 MG/1
75 CAPSULE ORAL 2 TIMES DAILY
Qty: 10 CAPSULE | Refills: 0 | Status: SHIPPED | OUTPATIENT
Start: 2024-02-28 | End: 2024-03-04

## 2024-02-28 RX ORDER — BENZONATATE 200 MG/1
200 CAPSULE ORAL 3 TIMES DAILY PRN
Qty: 45 CAPSULE | Refills: 0 | Status: SHIPPED | OUTPATIENT
Start: 2024-02-28